# Patient Record
Sex: FEMALE | Race: WHITE | NOT HISPANIC OR LATINO | Employment: OTHER
[De-identification: names, ages, dates, MRNs, and addresses within clinical notes are randomized per-mention and may not be internally consistent; named-entity substitution may affect disease eponyms.]

---

## 2017-02-28 ENCOUNTER — RECORDS - HEALTHEAST (OUTPATIENT)
Dept: ADMINISTRATIVE | Facility: OTHER | Age: 82
End: 2017-02-28

## 2017-04-20 ENCOUNTER — RECORDS - HEALTHEAST (OUTPATIENT)
Dept: ADMINISTRATIVE | Facility: OTHER | Age: 82
End: 2017-04-20

## 2017-05-10 ENCOUNTER — COMMUNICATION - HEALTHEAST (OUTPATIENT)
Dept: FAMILY MEDICINE | Facility: CLINIC | Age: 82
End: 2017-05-10

## 2017-05-10 DIAGNOSIS — K21.9 ESOPHAGEAL REFLUX: ICD-10-CM

## 2017-05-10 DIAGNOSIS — R32 URINARY INCONTINENCE: ICD-10-CM

## 2017-08-11 ENCOUNTER — COMMUNICATION - HEALTHEAST (OUTPATIENT)
Dept: FAMILY MEDICINE | Facility: CLINIC | Age: 82
End: 2017-08-11

## 2017-08-11 DIAGNOSIS — E78.5 HYPERLIPIDEMIA: ICD-10-CM

## 2017-09-07 ENCOUNTER — OFFICE VISIT - HEALTHEAST (OUTPATIENT)
Dept: FAMILY MEDICINE | Facility: CLINIC | Age: 82
End: 2017-09-07

## 2017-09-07 DIAGNOSIS — K21.9 GASTROESOPHAGEAL REFLUX DISEASE WITHOUT ESOPHAGITIS: ICD-10-CM

## 2017-09-07 DIAGNOSIS — M81.0 OSTEOPOROSIS: ICD-10-CM

## 2017-09-07 DIAGNOSIS — K62.5 BRIGHT RED RECTAL BLEEDING: ICD-10-CM

## 2017-09-07 DIAGNOSIS — G47.00 INSOMNIA, UNSPECIFIED: ICD-10-CM

## 2017-09-07 DIAGNOSIS — E78.5 HYPERLIPIDEMIA: ICD-10-CM

## 2017-09-07 DIAGNOSIS — N39.41 URGE INCONTINENCE: ICD-10-CM

## 2017-09-07 DIAGNOSIS — L94.0 CIRCUMSCRIBED SCLERODERMA: ICD-10-CM

## 2017-09-07 DIAGNOSIS — Z00.00 MEDICARE ANNUAL WELLNESS VISIT, INITIAL: ICD-10-CM

## 2017-09-07 DIAGNOSIS — Z23 NEED FOR VACCINATION: ICD-10-CM

## 2017-09-07 LAB
CHOLEST SERPL-MCNC: 168 MG/DL
FASTING STATUS PATIENT QL REPORTED: YES
HDLC SERPL-MCNC: 44 MG/DL
LDLC SERPL CALC-MCNC: 104 MG/DL
TRIGL SERPL-MCNC: 100 MG/DL

## 2017-09-07 ASSESSMENT — MIFFLIN-ST. JEOR: SCORE: 1067.52

## 2017-09-11 ENCOUNTER — COMMUNICATION - HEALTHEAST (OUTPATIENT)
Dept: FAMILY MEDICINE | Facility: CLINIC | Age: 82
End: 2017-09-11

## 2017-09-12 ENCOUNTER — COMMUNICATION - HEALTHEAST (OUTPATIENT)
Dept: FAMILY MEDICINE | Facility: CLINIC | Age: 82
End: 2017-09-12

## 2017-09-21 ENCOUNTER — RECORDS - HEALTHEAST (OUTPATIENT)
Dept: ADMINISTRATIVE | Facility: OTHER | Age: 82
End: 2017-09-21

## 2017-09-21 ENCOUNTER — RECORDS - HEALTHEAST (OUTPATIENT)
Dept: BONE DENSITY | Facility: CLINIC | Age: 82
End: 2017-09-21

## 2017-09-21 DIAGNOSIS — M81.0 AGE-RELATED OSTEOPOROSIS WITHOUT CURRENT PATHOLOGICAL FRACTURE: ICD-10-CM

## 2017-09-25 ENCOUNTER — COMMUNICATION - HEALTHEAST (OUTPATIENT)
Dept: FAMILY MEDICINE | Facility: CLINIC | Age: 82
End: 2017-09-25

## 2017-10-19 ENCOUNTER — HOSPITAL ENCOUNTER (OUTPATIENT)
Dept: MAMMOGRAPHY | Facility: HOSPITAL | Age: 82
Discharge: HOME | End: 2017-10-19
Attending: FAMILY MEDICINE

## 2017-10-19 DIAGNOSIS — Z12.31 VISIT FOR SCREENING MAMMOGRAM: ICD-10-CM

## 2017-11-09 ENCOUNTER — COMMUNICATION - HEALTHEAST (OUTPATIENT)
Dept: FAMILY MEDICINE | Facility: CLINIC | Age: 82
End: 2017-11-09

## 2017-11-09 DIAGNOSIS — E78.5 HYPERLIPIDEMIA: ICD-10-CM

## 2018-02-09 ENCOUNTER — COMMUNICATION - HEALTHEAST (OUTPATIENT)
Dept: FAMILY MEDICINE | Facility: CLINIC | Age: 83
End: 2018-02-09

## 2018-02-09 DIAGNOSIS — E78.5 HYPERLIPIDEMIA: ICD-10-CM

## 2018-05-09 ENCOUNTER — COMMUNICATION - HEALTHEAST (OUTPATIENT)
Dept: FAMILY MEDICINE | Facility: CLINIC | Age: 83
End: 2018-05-09

## 2018-05-09 DIAGNOSIS — E78.5 HYPERLIPIDEMIA: ICD-10-CM

## 2018-05-09 DIAGNOSIS — R32 URINARY INCONTINENCE: ICD-10-CM

## 2018-05-10 ENCOUNTER — RECORDS - HEALTHEAST (OUTPATIENT)
Dept: ADMINISTRATIVE | Facility: OTHER | Age: 83
End: 2018-05-10

## 2018-06-15 ENCOUNTER — RECORDS - HEALTHEAST (OUTPATIENT)
Dept: ADMINISTRATIVE | Facility: OTHER | Age: 83
End: 2018-06-15

## 2018-08-11 ENCOUNTER — COMMUNICATION - HEALTHEAST (OUTPATIENT)
Dept: FAMILY MEDICINE | Facility: CLINIC | Age: 83
End: 2018-08-11

## 2018-08-11 DIAGNOSIS — E78.5 HYPERLIPIDEMIA: ICD-10-CM

## 2018-08-11 DIAGNOSIS — K21.9 ESOPHAGEAL REFLUX: ICD-10-CM

## 2018-10-31 ENCOUNTER — OFFICE VISIT - HEALTHEAST (OUTPATIENT)
Dept: FAMILY MEDICINE | Facility: CLINIC | Age: 83
End: 2018-10-31

## 2018-10-31 DIAGNOSIS — E78.5 HYPERLIPIDEMIA: ICD-10-CM

## 2018-10-31 DIAGNOSIS — M81.0 OSTEOPOROSIS: ICD-10-CM

## 2018-10-31 DIAGNOSIS — Z00.00 MEDICARE ANNUAL WELLNESS VISIT, SUBSEQUENT: ICD-10-CM

## 2018-10-31 DIAGNOSIS — N39.41 URGE INCONTINENCE: ICD-10-CM

## 2018-10-31 DIAGNOSIS — G47.00 INSOMNIA, UNSPECIFIED: ICD-10-CM

## 2018-10-31 DIAGNOSIS — M17.0 BILATERAL PRIMARY OSTEOARTHRITIS OF KNEE: ICD-10-CM

## 2018-10-31 DIAGNOSIS — R26.89 BALANCE PROBLEM: ICD-10-CM

## 2018-10-31 DIAGNOSIS — K21.9 GASTROESOPHAGEAL REFLUX DISEASE WITHOUT ESOPHAGITIS: ICD-10-CM

## 2018-10-31 DIAGNOSIS — L94.0 CIRCUMSCRIBED SCLERODERMA: ICD-10-CM

## 2018-10-31 LAB
ALBUMIN SERPL-MCNC: 3.8 G/DL (ref 3.5–5)
ALP SERPL-CCNC: 78 U/L (ref 45–120)
ALT SERPL W P-5'-P-CCNC: 17 U/L (ref 0–45)
ANION GAP SERPL CALCULATED.3IONS-SCNC: 10 MMOL/L (ref 5–18)
AST SERPL W P-5'-P-CCNC: 24 U/L (ref 0–40)
BILIRUB SERPL-MCNC: 0.6 MG/DL (ref 0–1)
BUN SERPL-MCNC: 12 MG/DL (ref 8–28)
CALCIUM SERPL-MCNC: 9.8 MG/DL (ref 8.5–10.5)
CHLORIDE BLD-SCNC: 107 MMOL/L (ref 98–107)
CHOLEST SERPL-MCNC: 152 MG/DL
CO2 SERPL-SCNC: 27 MMOL/L (ref 22–31)
CREAT SERPL-MCNC: 0.81 MG/DL (ref 0.6–1.1)
FASTING STATUS PATIENT QL REPORTED: YES
GFR SERPL CREATININE-BSD FRML MDRD: >60 ML/MIN/1.73M2
GLUCOSE BLD-MCNC: 87 MG/DL (ref 70–125)
HDLC SERPL-MCNC: 45 MG/DL
LDLC SERPL CALC-MCNC: 84 MG/DL
POTASSIUM BLD-SCNC: 4.2 MMOL/L (ref 3.5–5)
PROT SERPL-MCNC: 6.6 G/DL (ref 6–8)
SODIUM SERPL-SCNC: 144 MMOL/L (ref 136–145)
TRIGL SERPL-MCNC: 117 MG/DL

## 2018-10-31 ASSESSMENT — MIFFLIN-ST. JEOR: SCORE: 1068.09

## 2018-11-01 LAB — 25(OH)D3 SERPL-MCNC: 44 NG/ML (ref 30–80)

## 2018-11-03 ENCOUNTER — COMMUNICATION - HEALTHEAST (OUTPATIENT)
Dept: FAMILY MEDICINE | Facility: CLINIC | Age: 83
End: 2018-11-03

## 2018-11-15 ENCOUNTER — COMMUNICATION - HEALTHEAST (OUTPATIENT)
Dept: FAMILY MEDICINE | Facility: CLINIC | Age: 83
End: 2018-11-15

## 2018-11-15 DIAGNOSIS — R32 URINARY INCONTINENCE: ICD-10-CM

## 2018-11-15 DIAGNOSIS — E78.5 HYPERLIPIDEMIA: ICD-10-CM

## 2019-02-14 ENCOUNTER — COMMUNICATION - HEALTHEAST (OUTPATIENT)
Dept: FAMILY MEDICINE | Facility: CLINIC | Age: 84
End: 2019-02-14

## 2019-02-14 DIAGNOSIS — K21.9 ESOPHAGEAL REFLUX: ICD-10-CM

## 2019-06-27 ENCOUNTER — RECORDS - HEALTHEAST (OUTPATIENT)
Dept: ADMINISTRATIVE | Facility: OTHER | Age: 84
End: 2019-06-27

## 2019-11-06 ENCOUNTER — OFFICE VISIT - HEALTHEAST (OUTPATIENT)
Dept: FAMILY MEDICINE | Facility: CLINIC | Age: 84
End: 2019-11-06

## 2019-11-06 DIAGNOSIS — K21.9 GASTROESOPHAGEAL REFLUX DISEASE WITHOUT ESOPHAGITIS: ICD-10-CM

## 2019-11-06 DIAGNOSIS — G47.00 INSOMNIA, UNSPECIFIED TYPE: ICD-10-CM

## 2019-11-06 DIAGNOSIS — E78.5 HYPERLIPIDEMIA, UNSPECIFIED HYPERLIPIDEMIA TYPE: ICD-10-CM

## 2019-11-06 DIAGNOSIS — N39.41 URGE INCONTINENCE: ICD-10-CM

## 2019-11-06 DIAGNOSIS — R26.89 BALANCE PROBLEM: ICD-10-CM

## 2019-11-06 DIAGNOSIS — M17.0 BILATERAL PRIMARY OSTEOARTHRITIS OF KNEE: ICD-10-CM

## 2019-11-06 DIAGNOSIS — Z00.00 MEDICARE ANNUAL WELLNESS VISIT, SUBSEQUENT: ICD-10-CM

## 2019-11-06 DIAGNOSIS — M81.8 OTHER OSTEOPOROSIS WITHOUT CURRENT PATHOLOGICAL FRACTURE: ICD-10-CM

## 2019-11-06 DIAGNOSIS — L94.0 CIRCUMSCRIBED SCLERODERMA: ICD-10-CM

## 2019-11-06 LAB
ALBUMIN SERPL-MCNC: 4.1 G/DL (ref 3.5–5)
ANION GAP SERPL CALCULATED.3IONS-SCNC: 10 MMOL/L (ref 5–18)
BUN SERPL-MCNC: 16 MG/DL (ref 8–28)
CALCIUM SERPL-MCNC: 9.8 MG/DL (ref 8.5–10.5)
CHLORIDE BLD-SCNC: 109 MMOL/L (ref 98–107)
CHOLEST SERPL-MCNC: 160 MG/DL
CO2 SERPL-SCNC: 27 MMOL/L (ref 22–31)
CREAT SERPL-MCNC: 0.77 MG/DL (ref 0.6–1.1)
FASTING STATUS PATIENT QL REPORTED: YES
GFR SERPL CREATININE-BSD FRML MDRD: >60 ML/MIN/1.73M2
GLUCOSE BLD-MCNC: 79 MG/DL (ref 70–125)
HDLC SERPL-MCNC: 51 MG/DL
LDLC SERPL CALC-MCNC: 91 MG/DL
PHOSPHATE SERPL-MCNC: 3.7 MG/DL (ref 2.5–4.5)
POTASSIUM BLD-SCNC: 4.4 MMOL/L (ref 3.5–5)
PTH-INTACT SERPL-MCNC: 52 PG/ML (ref 10–86)
SODIUM SERPL-SCNC: 146 MMOL/L (ref 136–145)
TRIGL SERPL-MCNC: 88 MG/DL
TSH SERPL DL<=0.005 MIU/L-ACNC: 2.11 UIU/ML (ref 0.3–5)

## 2019-11-06 RX ORDER — CLOBETASOL PROPIONATE 0.5 MG/G
OINTMENT TOPICAL
Qty: 30 G | Refills: 3 | Status: SHIPPED | OUTPATIENT
Start: 2019-11-06

## 2019-11-06 ASSESSMENT — MIFFLIN-ST. JEOR: SCORE: 1079.77

## 2019-11-07 LAB — 25(OH)D3 SERPL-MCNC: 44.9 NG/ML (ref 30–80)

## 2019-11-13 ENCOUNTER — RECORDS - HEALTHEAST (OUTPATIENT)
Dept: BONE DENSITY | Facility: CLINIC | Age: 84
End: 2019-11-13

## 2019-11-13 ENCOUNTER — RECORDS - HEALTHEAST (OUTPATIENT)
Dept: ADMINISTRATIVE | Facility: OTHER | Age: 84
End: 2019-11-13

## 2019-11-13 DIAGNOSIS — M81.0 AGE-RELATED OSTEOPOROSIS WITHOUT CURRENT PATHOLOGICAL FRACTURE: ICD-10-CM

## 2019-11-15 ENCOUNTER — COMMUNICATION - HEALTHEAST (OUTPATIENT)
Dept: FAMILY MEDICINE | Facility: CLINIC | Age: 84
End: 2019-11-15

## 2019-11-16 ENCOUNTER — COMMUNICATION - HEALTHEAST (OUTPATIENT)
Dept: FAMILY MEDICINE | Facility: CLINIC | Age: 84
End: 2019-11-16

## 2019-11-20 ENCOUNTER — HOSPITAL ENCOUNTER (OUTPATIENT)
Dept: MAMMOGRAPHY | Facility: CLINIC | Age: 84
Discharge: HOME | End: 2019-11-20
Attending: FAMILY MEDICINE

## 2019-11-20 DIAGNOSIS — Z12.31 VISIT FOR SCREENING MAMMOGRAM: ICD-10-CM

## 2020-10-22 ENCOUNTER — RECORDS - HEALTHEAST (OUTPATIENT)
Dept: ADMINISTRATIVE | Facility: OTHER | Age: 85
End: 2020-10-22

## 2020-10-29 ENCOUNTER — COMMUNICATION - HEALTHEAST (OUTPATIENT)
Dept: FAMILY MEDICINE | Facility: CLINIC | Age: 85
End: 2020-10-29

## 2020-10-29 DIAGNOSIS — K21.9 GASTROESOPHAGEAL REFLUX DISEASE WITHOUT ESOPHAGITIS: ICD-10-CM

## 2020-12-02 ENCOUNTER — COMMUNICATION - HEALTHEAST (OUTPATIENT)
Dept: FAMILY MEDICINE | Facility: CLINIC | Age: 85
End: 2020-12-02

## 2020-12-02 DIAGNOSIS — E78.5 HYPERLIPIDEMIA, UNSPECIFIED HYPERLIPIDEMIA TYPE: ICD-10-CM

## 2020-12-21 ENCOUNTER — OFFICE VISIT - HEALTHEAST (OUTPATIENT)
Dept: FAMILY MEDICINE | Facility: CLINIC | Age: 85
End: 2020-12-21

## 2020-12-21 DIAGNOSIS — R26.89 BALANCE PROBLEM: ICD-10-CM

## 2020-12-21 DIAGNOSIS — N39.41 URGE INCONTINENCE: ICD-10-CM

## 2020-12-21 DIAGNOSIS — R47.89 WORD FINDING DIFFICULTY: ICD-10-CM

## 2020-12-21 DIAGNOSIS — M81.0 AGE-RELATED OSTEOPOROSIS WITHOUT CURRENT PATHOLOGICAL FRACTURE: ICD-10-CM

## 2020-12-21 DIAGNOSIS — K21.9 GASTROESOPHAGEAL REFLUX DISEASE WITHOUT ESOPHAGITIS: ICD-10-CM

## 2020-12-21 DIAGNOSIS — Z00.00 MEDICARE ANNUAL WELLNESS VISIT, SUBSEQUENT: ICD-10-CM

## 2020-12-21 DIAGNOSIS — K59.01 SLOW TRANSIT CONSTIPATION: ICD-10-CM

## 2020-12-21 DIAGNOSIS — E78.5 HYPERLIPIDEMIA, UNSPECIFIED HYPERLIPIDEMIA TYPE: ICD-10-CM

## 2020-12-21 DIAGNOSIS — L94.0 CIRCUMSCRIBED SCLERODERMA: ICD-10-CM

## 2020-12-21 DIAGNOSIS — G47.00 INSOMNIA, UNSPECIFIED TYPE: ICD-10-CM

## 2020-12-21 DIAGNOSIS — M17.0 BILATERAL PRIMARY OSTEOARTHRITIS OF KNEE: ICD-10-CM

## 2020-12-21 LAB
ALBUMIN SERPL-MCNC: 4 G/DL (ref 3.5–5)
ALP SERPL-CCNC: 95 U/L (ref 45–120)
ALT SERPL W P-5'-P-CCNC: 16 U/L (ref 0–45)
ANION GAP SERPL CALCULATED.3IONS-SCNC: 10 MMOL/L (ref 5–18)
AST SERPL W P-5'-P-CCNC: 24 U/L (ref 0–40)
BILIRUB SERPL-MCNC: 0.4 MG/DL (ref 0–1)
BUN SERPL-MCNC: 13 MG/DL (ref 8–28)
CALCIUM SERPL-MCNC: 9.4 MG/DL (ref 8.5–10.5)
CHLORIDE BLD-SCNC: 108 MMOL/L (ref 98–107)
CHOLEST SERPL-MCNC: 147 MG/DL
CO2 SERPL-SCNC: 27 MMOL/L (ref 22–31)
CREAT SERPL-MCNC: 0.79 MG/DL (ref 0.6–1.1)
FASTING STATUS PATIENT QL REPORTED: YES
GFR SERPL CREATININE-BSD FRML MDRD: >60 ML/MIN/1.73M2
GLUCOSE BLD-MCNC: 98 MG/DL (ref 70–125)
HDLC SERPL-MCNC: 45 MG/DL
LDLC SERPL CALC-MCNC: 83 MG/DL
POTASSIUM BLD-SCNC: 4.2 MMOL/L (ref 3.5–5)
PROT SERPL-MCNC: 6.8 G/DL (ref 6–8)
SODIUM SERPL-SCNC: 145 MMOL/L (ref 136–145)
TRIGL SERPL-MCNC: 94 MG/DL
VIT B12 SERPL-MCNC: 390 PG/ML (ref 213–816)

## 2020-12-21 ASSESSMENT — MIFFLIN-ST. JEOR: SCORE: 1095.42

## 2020-12-22 LAB — 25(OH)D3 SERPL-MCNC: 43.6 NG/ML (ref 30–80)

## 2020-12-30 ENCOUNTER — COMMUNICATION - HEALTHEAST (OUTPATIENT)
Dept: FAMILY MEDICINE | Facility: CLINIC | Age: 85
End: 2020-12-30

## 2020-12-30 ENCOUNTER — HOSPITAL ENCOUNTER (OUTPATIENT)
Dept: MAMMOGRAPHY | Facility: CLINIC | Age: 85
Discharge: HOME | End: 2020-12-30
Attending: FAMILY MEDICINE

## 2020-12-30 DIAGNOSIS — Z12.31 VISIT FOR SCREENING MAMMOGRAM: ICD-10-CM

## 2021-01-01 ENCOUNTER — RECORDS - HEALTHEAST (OUTPATIENT)
Dept: ADMINISTRATIVE | Facility: CLINIC | Age: 86
End: 2021-01-01

## 2021-01-01 ENCOUNTER — HEALTH MAINTENANCE LETTER (OUTPATIENT)
Age: 86
End: 2021-01-01

## 2021-01-01 ENCOUNTER — COMMUNICATION - HEALTHEAST (OUTPATIENT)
Dept: FAMILY MEDICINE | Facility: CLINIC | Age: 86
End: 2021-01-01

## 2021-01-01 ENCOUNTER — RECORDS - HEALTHEAST (OUTPATIENT)
Dept: FAMILY MEDICINE | Facility: CLINIC | Age: 86
End: 2021-01-01

## 2021-01-01 ENCOUNTER — HOSPITAL ENCOUNTER (OUTPATIENT)
Dept: CARDIOLOGY | Facility: CLINIC | Age: 86
Discharge: HOME | End: 2021-11-22
Attending: FAMILY MEDICINE | Admitting: FAMILY MEDICINE
Payer: COMMERCIAL

## 2021-01-01 ENCOUNTER — TELEPHONE (OUTPATIENT)
Dept: FAMILY MEDICINE | Facility: CLINIC | Age: 86
End: 2021-01-01
Payer: COMMERCIAL

## 2021-01-01 ENCOUNTER — AMBULATORY - HEALTHEAST (OUTPATIENT)
Dept: NURSING | Facility: CLINIC | Age: 86
End: 2021-01-01

## 2021-01-01 ENCOUNTER — TRANSFERRED RECORDS (OUTPATIENT)
Dept: HEALTH INFORMATION MANAGEMENT | Facility: CLINIC | Age: 86
End: 2021-01-01
Payer: COMMERCIAL

## 2021-01-01 ENCOUNTER — OFFICE VISIT (OUTPATIENT)
Dept: FAMILY MEDICINE | Facility: CLINIC | Age: 86
End: 2021-01-01
Payer: COMMERCIAL

## 2021-01-01 VITALS — WEIGHT: 146.9 LBS | HEIGHT: 63 IN | BODY MASS INDEX: 26.03 KG/M2

## 2021-01-01 VITALS
DIASTOLIC BLOOD PRESSURE: 60 MMHG | HEIGHT: 63 IN | HEART RATE: 72 BPM | OXYGEN SATURATION: 98 % | RESPIRATION RATE: 20 BRPM | BODY MASS INDEX: 26.51 KG/M2 | SYSTOLIC BLOOD PRESSURE: 112 MMHG | WEIGHT: 149.6 LBS | TEMPERATURE: 98.1 F

## 2021-01-01 VITALS
WEIGHT: 149.2 LBS | HEART RATE: 88 BPM | DIASTOLIC BLOOD PRESSURE: 86 MMHG | BODY MASS INDEX: 26.43 KG/M2 | SYSTOLIC BLOOD PRESSURE: 130 MMHG

## 2021-01-01 VITALS — HEIGHT: 62 IN | WEIGHT: 147.9 LBS | BODY MASS INDEX: 27.22 KG/M2

## 2021-01-01 VITALS
SYSTOLIC BLOOD PRESSURE: 128 MMHG | HEART RATE: 75 BPM | HEIGHT: 63 IN | TEMPERATURE: 98.7 F | RESPIRATION RATE: 20 BRPM | OXYGEN SATURATION: 97 % | DIASTOLIC BLOOD PRESSURE: 78 MMHG | WEIGHT: 151.3 LBS | BODY MASS INDEX: 26.81 KG/M2

## 2021-01-01 VITALS
TEMPERATURE: 97.6 F | DIASTOLIC BLOOD PRESSURE: 84 MMHG | RESPIRATION RATE: 16 BRPM | HEART RATE: 75 BPM | WEIGHT: 147.6 LBS | SYSTOLIC BLOOD PRESSURE: 128 MMHG | BODY MASS INDEX: 26.15 KG/M2 | OXYGEN SATURATION: 98 %

## 2021-01-01 DIAGNOSIS — K21.9 GASTROESOPHAGEAL REFLUX DISEASE WITHOUT ESOPHAGITIS: ICD-10-CM

## 2021-01-01 DIAGNOSIS — Z12.31 OTHER SCREENING MAMMOGRAM: ICD-10-CM

## 2021-01-01 DIAGNOSIS — B35.3 TINEA PEDIS OF RIGHT FOOT: ICD-10-CM

## 2021-01-01 DIAGNOSIS — L94.0 CIRCUMSCRIBED SCLERODERMA: ICD-10-CM

## 2021-01-01 DIAGNOSIS — R01.1 SYSTOLIC EJECTION MURMUR: ICD-10-CM

## 2021-01-01 DIAGNOSIS — N39.41 URGE INCONTINENCE: ICD-10-CM

## 2021-01-01 DIAGNOSIS — E78.5 HYPERLIPIDEMIA, UNSPECIFIED HYPERLIPIDEMIA TYPE: ICD-10-CM

## 2021-01-01 DIAGNOSIS — G89.29 CHRONIC PAIN OF BOTH KNEES: Primary | ICD-10-CM

## 2021-01-01 DIAGNOSIS — M81.0 AGE-RELATED OSTEOPOROSIS WITHOUT CURRENT PATHOLOGICAL FRACTURE: ICD-10-CM

## 2021-01-01 DIAGNOSIS — M25.561 CHRONIC PAIN OF BOTH KNEES: Primary | ICD-10-CM

## 2021-01-01 DIAGNOSIS — M25.562 CHRONIC PAIN OF BOTH KNEES: Primary | ICD-10-CM

## 2021-01-01 DIAGNOSIS — E78.00 PURE HYPERCHOLESTEROLEMIA: ICD-10-CM

## 2021-01-01 DIAGNOSIS — Z00.00 MEDICARE ANNUAL WELLNESS VISIT, SUBSEQUENT: Primary | ICD-10-CM

## 2021-01-01 LAB
ALBUMIN SERPL-MCNC: 3.8 G/DL (ref 3.5–5)
ALP SERPL-CCNC: 73 U/L (ref 45–120)
ALT SERPL W P-5'-P-CCNC: 16 U/L (ref 0–45)
ANION GAP SERPL CALCULATED.3IONS-SCNC: 10 MMOL/L (ref 5–18)
AST SERPL W P-5'-P-CCNC: 24 U/L (ref 0–40)
BILIRUB SERPL-MCNC: 0.5 MG/DL (ref 0–1)
BUN SERPL-MCNC: 14 MG/DL (ref 8–28)
CALCIUM SERPL-MCNC: 9.6 MG/DL (ref 8.5–10.5)
CHLORIDE BLD-SCNC: 108 MMOL/L (ref 98–107)
CHOLEST SERPL-MCNC: 140 MG/DL
CO2 SERPL-SCNC: 27 MMOL/L (ref 22–31)
CREAT SERPL-MCNC: 0.75 MG/DL (ref 0.6–1.1)
DEPRECATED CALCIDIOL+CALCIFEROL SERPL-MC: 47 UG/L (ref 30–80)
FASTING STATUS PATIENT QL REPORTED: YES
GFR SERPL CREATININE-BSD FRML MDRD: 72 ML/MIN/1.73M2
GLUCOSE BLD-MCNC: 83 MG/DL (ref 70–125)
HDLC SERPL-MCNC: 46 MG/DL
LDLC SERPL CALC-MCNC: 75 MG/DL
LVEF ECHO: NORMAL
POTASSIUM BLD-SCNC: 4 MMOL/L (ref 3.5–5)
PROT SERPL-MCNC: 6.5 G/DL (ref 6–8)
SODIUM SERPL-SCNC: 145 MMOL/L (ref 136–145)
TRIGL SERPL-MCNC: 94 MG/DL

## 2021-01-01 PROCEDURE — 80053 COMPREHEN METABOLIC PANEL: CPT | Performed by: FAMILY MEDICINE

## 2021-01-01 PROCEDURE — 93306 TTE W/DOPPLER COMPLETE: CPT

## 2021-01-01 PROCEDURE — 99213 OFFICE O/P EST LOW 20 MIN: CPT | Performed by: FAMILY MEDICINE

## 2021-01-01 PROCEDURE — 93306 TTE W/DOPPLER COMPLETE: CPT | Mod: 26 | Performed by: INTERNAL MEDICINE

## 2021-01-01 PROCEDURE — 99397 PER PM REEVAL EST PAT 65+ YR: CPT | Performed by: FAMILY MEDICINE

## 2021-01-01 PROCEDURE — 99214 OFFICE O/P EST MOD 30 MIN: CPT | Mod: 25 | Performed by: FAMILY MEDICINE

## 2021-01-01 PROCEDURE — 82306 VITAMIN D 25 HYDROXY: CPT | Performed by: FAMILY MEDICINE

## 2021-01-01 PROCEDURE — 80061 LIPID PANEL: CPT | Performed by: FAMILY MEDICINE

## 2021-01-01 PROCEDURE — 36415 COLL VENOUS BLD VENIPUNCTURE: CPT | Performed by: FAMILY MEDICINE

## 2021-01-01 RX ORDER — LOVASTATIN 40 MG
TABLET ORAL
Qty: 180 TABLET | Refills: 3 | Status: SHIPPED | OUTPATIENT
Start: 2021-01-01

## 2021-01-01 RX ORDER — CLOTRIMAZOLE 1 %
CREAM (GRAM) TOPICAL 2 TIMES DAILY
Qty: 30 G | Refills: 0 | Status: SHIPPED | OUTPATIENT
Start: 2021-01-01 | End: 2021-01-01

## 2021-01-01 RX ORDER — LOVASTATIN 40 MG
TABLET ORAL
Qty: 180 TABLET | Refills: 2 | Status: SHIPPED | OUTPATIENT
Start: 2021-01-01 | End: 2021-01-01

## 2021-01-01 RX ORDER — TOLTERODINE 4 MG/1
4 CAPSULE, EXTENDED RELEASE ORAL DAILY
Qty: 90 CAPSULE | Refills: 3 | Status: SHIPPED | OUTPATIENT
Start: 2021-01-01

## 2021-01-01 SDOH — ECONOMIC STABILITY: TRANSPORTATION INSECURITY
IN THE PAST 12 MONTHS, HAS LACK OF TRANSPORTATION KEPT YOU FROM MEETINGS, WORK, OR FROM GETTING THINGS NEEDED FOR DAILY LIVING?: NO

## 2021-01-01 SDOH — ECONOMIC STABILITY: TRANSPORTATION INSECURITY
IN THE PAST 12 MONTHS, HAS THE LACK OF TRANSPORTATION KEPT YOU FROM MEDICAL APPOINTMENTS OR FROM GETTING MEDICATIONS?: NO

## 2021-01-01 SDOH — HEALTH STABILITY: PHYSICAL HEALTH: ON AVERAGE, HOW MANY MINUTES DO YOU ENGAGE IN EXERCISE AT THIS LEVEL?: 30 MIN

## 2021-01-01 SDOH — ECONOMIC STABILITY: FOOD INSECURITY: WITHIN THE PAST 12 MONTHS, YOU WORRIED THAT YOUR FOOD WOULD RUN OUT BEFORE YOU GOT MONEY TO BUY MORE.: NEVER TRUE

## 2021-01-01 SDOH — ECONOMIC STABILITY: INCOME INSECURITY: HOW HARD IS IT FOR YOU TO PAY FOR THE VERY BASICS LIKE FOOD, HOUSING, MEDICAL CARE, AND HEATING?: NOT HARD AT ALL

## 2021-01-01 SDOH — ECONOMIC STABILITY: FOOD INSECURITY: WITHIN THE PAST 12 MONTHS, THE FOOD YOU BOUGHT JUST DIDN'T LAST AND YOU DIDN'T HAVE MONEY TO GET MORE.: NEVER TRUE

## 2021-01-01 SDOH — HEALTH STABILITY: PHYSICAL HEALTH: ON AVERAGE, HOW MANY DAYS PER WEEK DO YOU ENGAGE IN MODERATE TO STRENUOUS EXERCISE (LIKE A BRISK WALK)?: 2 DAYS

## 2021-01-01 SDOH — ECONOMIC STABILITY: INCOME INSECURITY: IN THE LAST 12 MONTHS, WAS THERE A TIME WHEN YOU WERE NOT ABLE TO PAY THE MORTGAGE OR RENT ON TIME?: NO

## 2021-01-01 ASSESSMENT — SOCIAL DETERMINANTS OF HEALTH (SDOH)
DO YOU BELONG TO ANY CLUBS OR ORGANIZATIONS SUCH AS CHURCH GROUPS UNIONS, FRATERNAL OR ATHLETIC GROUPS, OR SCHOOL GROUPS?: NO
IN A TYPICAL WEEK, HOW MANY TIMES DO YOU TALK ON THE PHONE WITH FAMILY, FRIENDS, OR NEIGHBORS?: MORE THAN THREE TIMES A WEEK
HOW OFTEN DO YOU GET TOGETHER WITH FRIENDS OR RELATIVES?: THREE TIMES A WEEK
HOW OFTEN DO YOU ATTEND CHURCH OR RELIGIOUS SERVICES?: MORE THAN 4 TIMES PER YEAR

## 2021-01-01 ASSESSMENT — ACTIVITIES OF DAILY LIVING (ADL): CURRENT_FUNCTION: NO ASSISTANCE NEEDED

## 2021-01-01 ASSESSMENT — LIFESTYLE VARIABLES
HOW OFTEN DO YOU HAVE SIX OR MORE DRINKS ON ONE OCCASION: NEVER
HOW MANY STANDARD DRINKS CONTAINING ALCOHOL DO YOU HAVE ON A TYPICAL DAY: PATIENT DECLINED
HOW OFTEN DO YOU HAVE A DRINK CONTAINING ALCOHOL: NEVER

## 2021-06-03 NOTE — PROGRESS NOTES
Assessment and Plan:     1. Medicare annual wellness visit, subsequent  At today's visit, we discussed lifestyle interventions to promote self-management and wellness, including maintenance of a healthy weight, healthy diet, regular physical activity and exercise, and falls prevention.  Immunizations reviewed and up-to-date.  Will obtain fasting lipids and fasting glucose today.  Cancer screening no longer indicated.  Encouraged efforts at regular exercise.  Will address balance as noted below.  Advanced healthcare directive on file.    2. Hyperlipidemia, unspecified hyperlipidemia type  Encourage continued efforts at healthy lifestyle habits.  Continue lovastatin.  We will check fasting lipid cascade today.  - Lipid Cascade FASTING  - lovastatin (MEVACOR) 40 MG tablet; Take 2 tablets (80 mg total) by mouth at bedtime.  Dispense: 180 tablet; Refill: 3    3. Gastroesophageal reflux disease without esophagitis  Adequately controlled on omeprazole daily, will continue as she has failed prior to moving  - omeprazole (PRILOSEC) 20 MG capsule; Take 1 capsule (20 mg total) by mouth daily.  Dispense: 90 capsule; Refill: 2    4. Lichen Sclerosus Et Atrophicus  Controlled with pain control, follow-up with gynecology.  - clobetasol (TEMOVATE) 0.05 % ointment; Apply to affected area twice daily as needed.  Dispense: 30 g; Refill: 3    5. Insomnia, unspecified type  Encouraged continued efforts at good sleep hygiene.  Energy level is okay.  Continue to avoid caffeine late in the day and continue regular exercise    6. Urge Incontinence Of Urine  Chronic, working with Dr. Clemons in regards to considering Botox versus InterStim.  We will restart tolterodine and Myrbetriq was not cost effective for her.  Encouraged consideration of pelvic floor physical therapy to address this further.  - tolterodine (DETROL LA) 4 MG ER capsule; Take 1 capsule (4 mg total) by mouth daily.  Dispense: 90 capsule; Refill: 4    7. Bilateral primary  osteoarthritis of knee  She will continue over-the-counter medications and supplements, continue physical therapy.    8. Balance problem  Encouraged regular physical activity.  Encouraged her to complete the physical therapy exercises as she had previously been assigned as this has been helpful.    9. Other osteoporosis without current pathological fracture   She is not interested in further treatment at this time, would like to see him to follow-up on density scans as high duration of treatment.  In the meantime was recommended increased intake of calcium and vitamin D.  Exercising by walking.  Order placed for follow-up  - Renal Function Profile  - Parathyroid Hormone Intact  - Vitamin D, Total (25-Hydroxy)  - Thyroid Cascade  - DXA Bone Density Scan; Future     The patient's current medical problems were reviewed.    The following health maintenance schedule was reviewed with the patient and provided in printed form in the after visit summary:   Health Maintenance   Topic Date Due     ZOSTER VACCINES (2 of 3) 09/06/2007     INFLUENZA VACCINE RULE BASED (1) 08/01/2019     DXA SCAN  09/21/2019     FALL RISK ASSESSMENT  10/31/2019     MEDICARE ANNUAL WELLNESS VISIT  10/31/2019     ADVANCE CARE PLANNING  10/31/2023     TD 18+ HE  07/07/2025     PNEUMOCOCCAL IMMUNIZATION 65+ LOW/MEDIUM RISK  Completed        Subjective:   Chief Complaint: An Haas is an 85 y.o. female here for an Annual Wellness visit.   HPI: History of esophageal reflux which is doing well on omeprazole.  She has attempted to wean the omeprazole but is failed on one medication.  History of osteoporosis diagnosed and is working to increase her intake of milk and calcium, she is not interested in treatment at this time would like to schedule a follow-up bone density scan shows.  She has a history of poor balance, completed physical therapy in 2018 for this and continues to do this exercises regularly.  She is also working on walking 2 times per  week.  No recent falls.  Remains on lovastatin management of dyslipidemia.  History of lichen sclerosis managed with clobetasol by gynecology.  Dr. Laurent is also helping her with her urge incontinence of urine, considering Botox versus InterStim.  In the past use Myrbetriq which was helpful but was very costly.  She is awakening about every 2 hours overnight, interested in an alternative medication, etc. although somewhat helpful in the past and she tolerated well.  Some struggles with insomnia, does not require medications for this that awakens about every 2 hours as noted.  She is been trying to go to bed earlier and feels her energy level overall is okay some struggles with bilateral knee osteoarthritis, really only, tramadol and supplements, previously physical therapy and management of this.  Using Preparation H as needed for external hemorrhoids without bleeding.    Review of Systems:   Please see above.  The rest of the review of systems are negative for all systems.    Patient Care Team:  Rosana Yepez MD as PCP - General  Amber Navarro MD (Dermatology)  Dave Guajardo MD as Physician (Otolaryngology)  Rosana Yepez MD as Assigned PCP     Patient Active Problem List   Diagnosis     Esophageal Reflux     Lichen Sclerosus Et Atrophicus     Insomnia     Urge Incontinence Of Urine     Arthritis     Hyperlipidemia     Osteoporosis     Bilateral primary osteoarthritis of knee     Balance problem     No past medical history on file.   Past Surgical History:   Procedure Laterality Date     BREAST BIOPSY       HYSTERECTOMY       OOPHORECTOMY      ?unknown with hysterectomy?     [place] APPENDECTOMY      Description: Appendectomy;  Recorded: 05/12/2008;     [place] LAP,SLING OPERATION      Description: Laparoscopic Sling Operation For Stress Incontinence;  Recorded: 05/12/2008;     [place] LAP,VAG HYST,UTERUS 250GMS/<      Description: Laparoscopy With Vaginal Hysterectomy;  Recorded: 06/02/2009;      Family  History   Problem Relation Age of Onset     Endometrial cancer Mother      Cancer Mother       Social History     Socioeconomic History     Marital status:      Spouse name: Not on file     Number of children: Not on file     Years of education: Not on file     Highest education level: Not on file   Occupational History     Not on file   Social Needs     Financial resource strain: Not on file     Food insecurity:     Worry: Not on file     Inability: Not on file     Transportation needs:     Medical: Not on file     Non-medical: Not on file   Tobacco Use     Smoking status: Never Smoker     Smokeless tobacco: Never Used   Substance and Sexual Activity     Alcohol use: Not on file     Drug use: Not on file     Sexual activity: Not on file   Lifestyle     Physical activity:     Days per week: Not on file     Minutes per session: Not on file     Stress: Not on file   Relationships     Social connections:     Talks on phone: Not on file     Gets together: Not on file     Attends Gnosticist service: Not on file     Active member of club or organization: Not on file     Attends meetings of clubs or organizations: Not on file     Relationship status: Not on file     Intimate partner violence:     Fear of current or ex partner: Not on file     Emotionally abused: Not on file     Physically abused: Not on file     Forced sexual activity: Not on file   Other Topics Concern     Not on file   Social History Narrative     Not on file      Current Outpatient Medications   Medication Sig Dispense Refill     aspirin 81 MG EC tablet Take 81 mg by mouth daily.       CALCIUM CARBONATE/VITAMIN D3 (CALCIUM 600 + D,3, ORAL) Take by mouth daily.       cholecalciferol, vitamin D3, (VITAMIN D3) 400 unit cap        clobetasol (TEMOVATE) 0.05 % ointment Apply to affected area twice daily as needed. 30 g 3     clotrimazole (LOTRIMIN) 1 % cream Apply topically 2 (two) times a day as needed. 60 g 0     fluocinonide (LIDEX) 0.05 % ointment  "Apply a thin layer to affected area twice daily as needed. 30 g 3     lovastatin (MEVACOR) 40 MG tablet Take 2 tablets (80 mg total) by mouth at bedtime. 180 tablet 3     omeprazole (PRILOSEC) 20 MG capsule Take 1 capsule (20 mg total) by mouth daily. 90 capsule 2     tolterodine (DETROL LA) 4 MG ER capsule Take 1 capsule (4 mg total) by mouth daily. 90 capsule 4     No current facility-administered medications for this visit.       Objective:   Vital Signs:   Visit Vitals  /60   Pulse 72   Temp 98.1  F (36.7  C)   Resp 20   Ht 5' 2.5\" (1.588 m)   Wt 149 lb 9.6 oz (67.9 kg)   SpO2 98%   BMI 26.93 kg/m         VisionScreening:  No exam data present     PHYSICAL EXAM  Physical Examination: General appearance - alert, well appearing, and in no distress, oriented to person, place, and time and normal appearing weight  Mental status - alert, oriented to person, place, and time, normal mood, behavior, speech, dress, motor activity, and thought processes  Eyes - pupils equal and reactive, extraocular eye movements intact  Ears - bilateral TM's and external ear canals normal  Nose - normal and patent, no erythema, discharge or polyps  Mouth - mucous membranes moist, pharynx normal without lesions  Neck - supple, no significant adenopathy  Lymphatics - no palpable lymphadenopathy, no hepatosplenomegaly  Chest - clear to auscultation, no wheezes, rales or rhonchi, symmetric air entry  Heart - normal rate, regular rhythm, normal S1, S2, no murmurs, rubs, clicks or gallops  Abdomen - soft, nontender, nondistended, no masses or organomegaly  Breasts - breasts appear normal, no suspicious masses, no skin or nipple changes or axillary nodes  Neurological - alert, oriented, normal speech, no focal findings or movement disorder noted  Musculoskeletal - no joint tenderness, deformity or swelling  Extremities - peripheral pulses normal, no pedal edema, no clubbing or cyanosis  Skin - normal coloration and turgor, no rashes, no " suspicious skin lesions noted      Assessment Results 11/6/2019   Activities of Daily Living No help needed   Instrumental Activities of Daily Living No help needed   Mini Cog Total Score 5   Some recent data might be hidden     A Mini-Cog score of 0-2 suggests the possibility of dementia, score of 3-5 suggests no dementia    Identified Health Risks:     She is at risk for lack of exercise and has been provided with information to increase physical activity for the benefit of her well-being.  Information on urinary incontinence and treatment options given to patient.  Patient's advanced directive was discussed and I am comfortable with the patient's wishes.

## 2021-06-12 NOTE — TELEPHONE ENCOUNTER
Refill Approved    Rx renewed per Medication Renewal Policy. Medication was last renewed on 11/6/19.    Shikha Lepe, [facility] Connection Triage/Med Refill 11/2/2020     Requested Prescriptions   Pending Prescriptions Disp Refills     omeprazole (PRILOSEC) 20 MG capsule [Pharmacy Med Name: Omeprazole Oral Capsule Delayed Release 20 MG] 90 capsule 0     Sig: Take 1 capsule (20 mg total) by mouth daily.       GI Medications Refill Protocol Passed - 10/29/2020  9:20 AM        Passed - PCP or prescribing provider visit in last 12 or next 3 months.     Last office visit with prescriber/PCP: Visit date not found OR same dept: Visit date not found OR same specialty: Visit date not found  Last physical: 11/6/2019 Last MTM visit: Visit date not found   Next visit within 3 mo: Visit date not found  Next physical within 3 mo: Visit date not found  Prescriber OR PCP: Rosana Yepez MD  Last diagnosis associated with med order: 1. Gastroesophageal reflux disease without esophagitis  - omeprazole (PRILOSEC) 20 MG capsule [Pharmacy Med Name: Omeprazole Oral Capsule Delayed Release 20 MG]; Take 1 capsule (20 mg total) by mouth daily.  Dispense: 90 capsule; Refill: 0    If protocol passes may refill for 12 months if within 3 months of last provider visit (or a total of 15 months).

## 2021-06-12 NOTE — PROGRESS NOTES
Assessment and Plan:     1. Medicare annual wellness visit, initial  At today's visit, we discussed lifestyle interventions to promote self-management and wellness, including maintenance of a healthy weight, healthy diet, regular physical activity and exercise, and falls prevention.  Immunizations reviewed, influenza vaccine given.  Referral made for bone density scan.  Will obtain fasting lipids and fasting glucose today.  She has an advanced healthcare directive.    2. Need for vaccination  - Influenza High Dose, Seasonal 65+ yrs    3. Gastroesophageal reflux disease without esophagitis  She will remain on omeprazole daily.    4. Insomnia  Chronic and stable.    5. Lichen Sclerosus Et Atrophicus  Encourage continued use of clobetasol, will add clotrimazole to this as well as I suspect it may be secondary yeast infection component.  Notify me with inadequate effect or worsening.    6. Urge Incontinence Of Urine  Continued significant symptoms despite previous surgeries and medication.  Encouraged her to do a trial off Detrol as it could be contributing to her constipation.  I advised at home attempting Kegel exercises.  Consider pelvic floor physical therapy.    7. Hyperlipidemia  Encouraged continued use of lovastatin.  Will obtain conference of metabolic panel.  Will obtain fasting lipid cascade today.  Encouraged healthy lifestyle habits.  - Comprehensive Metabolic Panel  - Lipid Cascade FASTING    8. Osteoporosis  Anna placed for follow-up bone density scan.  Will check vitamin D level today.  - Vitamin D, Total (25-Hydroxy)  - DXA Bone Density Scan; Future    9. Bright red rectal bleeding  Seems to be due to internal hemorrhoid by exam and by history.  Reassuring.  Advised avoidance of constipation with discontinuation of Detrol, adequate fiber intake, adequate fluid intake, and regular exercise.  Avoid straining with stools.  Over-the-counter hemorrhoid creams as needed.  Notify with return or  worsening.  - HM2(CBC w/o Differential)      The patient's current medical problems were reviewed.    The following health maintenance schedule was reviewed with the patient and provided in printed form in the after visit summary:   Health Maintenance   Topic Date Due     FALL RISK ASSESSMENT  03/14/2017     INFLUENZA VACCINE RULE BASED (1) 08/01/2017     DXA SCAN  09/02/2017     ADVANCE DIRECTIVES DISCUSSED WITH PATIENT  08/10/2021     TD 18+ HE  07/07/2025     PNEUMOCOCCAL POLYSACCHARIDE VACCINE AGE 65 AND OVER  Completed     PNEUMOCOCCAL CONJUGATE VACCINE FOR ADULTS (PCV13 OR PREVNAR)  Completed     ZOSTER VACCINE  Completed        Subjective:   Chief Complaint: An Haas is an 83 y.o. female here for an Annual Wellness visit.   HPI: Unfortunately she had a bout of rectal bleeding about 2 weeks ago.  Has a history of bright red blood on occasion on stool on the outside but not mixed in the stool.  Notes that she tends to have firm bowel movements.  They are not painful.  2 weeks ago had a firm bowel movement, had more blood than usual in the toilet and then as she stood up she actually had 2 drops of blood on the floor.  This is a change for her.  No bleeding since then.  She did not have any pain or stomach discomfort from it.    History of esophageal reflux for which he takes omeprazole daily with good effect.  Lichen sclerosis at atrophicus has been controlled with intermittent use of clobetasol for the most part she just tries to avoid using it.  Has had some intermittent labial itching and irritation in the rectovaginal region.  She is chronic insomnia with frequent awakening, finds it if she has family members staying with her she feels better.  She is not really interested in the PET at this time.  Takes no medication for this.  Long-standing history of urge incontinence for which she is taking Detrol.  Despite this is needing to wear a pad with intermittent urge incontinence still occurring despite  Detrol and previous vaginal mesh surgery.  She is frustrated by this.  Finds that the pads oftentimes will cause external irritation of her bottom, wondering about what she can do about this.  Known hyperlipidemia for which she takes lovastatin.  She has osteoporosis as well, due for follow-up bone density scan.    Review of Systems:   Please see above.  The rest of the review of systems are negative for all systems.    Patient Care Team:  Rosana Yepez MD as PCP - General  Amber OLIVER MD (Dermatology)  Dave Guajardo MD as Physician (Otolaryngology)     Patient Active Problem List   Diagnosis     Esophageal Reflux     Lichen Sclerosus Et Atrophicus     Insomnia     Urge Incontinence Of Urine     Arthritis     Hyperlipidemia     Alopecia     Osteoporosis     No past medical history on file.   Past Surgical History:   Procedure Laterality Date     BREAST BIOPSY       HYSTERECTOMY       OOPHORECTOMY      ?unknown with hysterectomy?     [place] APPENDECTOMY      Description: Appendectomy;  Recorded: 05/12/2008;     [place] LAP,SLING OPERATION      Description: Laparoscopic Sling Operation For Stress Incontinence;  Recorded: 05/12/2008;     [place] LAP,VAG HYST,UTERUS 250GMS/<      Description: Laparoscopy With Vaginal Hysterectomy;  Recorded: 06/02/2009;      Family History   Problem Relation Age of Onset     Endometrial cancer Mother      Cancer Mother       Social History     Social History     Marital status:      Spouse name: N/A     Number of children: N/A     Years of education: N/A     Occupational History     Not on file.     Social History Main Topics     Smoking status: Never Smoker     Smokeless tobacco: Not on file     Alcohol use Not on file     Drug use: Not on file     Sexual activity: Not on file     Other Topics Concern     Not on file     Social History Narrative      Current Outpatient Prescriptions   Medication Sig Dispense Refill     aspirin 81 MG EC tablet Take 81 mg by mouth daily.        "CALCIUM CARBONATE/VITAMIN D3 (CALCIUM 600 + D,3, ORAL) Take by mouth daily.       cholecalciferol, vitamin D3, (VITAMIN D3) 1,000 unit capsule Take 1,000 Units by mouth daily.       fluocinonide (LIDEX) 0.05 % ointment Apply a thin layer to affected area twice daily as needed. 30 g 3     lovastatin (MEVACOR) 40 MG tablet Take 2 tablets (80 mg total) by mouth at bedtime. 180 tablet 0     omeprazole (PRILOSEC) 20 MG capsule TAKE 1 CAPSULE (20 MG) BY ORAL ROUTE ONCE DAILY (Patient taking differently: as needed) 90 capsule 3     tolterodine (DETROL LA) 4 MG ER capsule TAKE ONE CAPSULE BY MOUTH ONE TIME DAILY  90 capsule 3     cholecalciferol, vitamin D3, (VITAMIN D3) 400 unit cap        clobetasol (TEMOVATE) 0.05 % ointment Apply to affected area twice daily as needed. 30 g 3     clotrimazole 1 % Oint Apply 1 Ointment topically 2 (two) times a day as needed. Apply thin layer to affected area twice daily as needed 60 g 4     No current facility-administered medications for this visit.       Objective:   Vital Signs:   Visit Vitals     /80 (Patient Site: Right Arm, Patient Position: Sitting, Cuff Size: Adult Regular)     Pulse 76     Temp 97.6  F (36.4  C) (Oral)     Resp 20     Ht 5' 2.5\" (1.588 m)     Wt 146 lb 14.4 oz (66.6 kg)     SpO2 98%     BMI 26.44 kg/m2        VisionScreening:  No exam data present     PHYSICAL EXAM  Physical Examination: General appearance - alert, well appearing, and in no distress, oriented to person, place, and time and normal appearing weight  Mental status - alert, oriented to person, place, and time  Eyes - pupils equal and reactive, extraocular eye movements intact  Ears - bilateral TM's and external ear canals normal  Nose - normal and patent, no erythema, discharge or polyps  Mouth - mucous membranes moist, pharynx normal without lesions  Neck - supple, no significant adenopathy, thyroid exam: thyroid is normal in size without nodules or tenderness  Lymphatics - no palpable " lymphadenopathy, no hepatosplenomegaly  Chest - clear to auscultation, no wheezes, rales or rhonchi, symmetric air entry  Heart - normal rate, regular rhythm, normal S1, S2, no murmurs, rubs, clicks or gallops  Abdomen - soft, nontender, nondistended, no masses or organomegaly  Breasts - breasts appear normal, no suspicious masses, no skin or nipple changes or axillary nodes  Pelvic -mild atrophy of the mucosa of the vulva.  She has some maceration of the skin of the perineal and perirectal region.  I do not appreciate any satellite lesions.  Rectal -  no masses, perirectal skin as noted above.  Anal speculum exam revealing large internal hemorrhoid at the 9 o'clock position with patient supine, appears as though there is recent bleeding.  She has a few scattered smaller internal hemorrhoids present that appear more benign.  No lesions, ulcerations, or masses.  Musculoskeletal - no joint tenderness, deformity or swelling  Extremities - peripheral pulses normal, no pedal edema, no clubbing or cyanosis, no pedal edema noted  Skin - normal coloration and turgor, no rashes, no suspicious skin lesions noted      Assessment Results 9/7/2017   Activities of Daily Living No help needed   Instrumental Activities of Daily Living No help needed   Some recent data might be hidden     A Mini-Cog score of 0-2 suggests the possibility of dementia, score of 3-5 suggests no dementia    Identified Health Risks:     She is at risk for lack of exercise and has been provided with information to increase physical activity for the benefit of her well-being.  Patient's advanced directive was discussed and I am comfortable with the patient's wishes.

## 2021-06-13 NOTE — TELEPHONE ENCOUNTER
FYI - Status Update  Who is Calling: Patient  Update: Patient did make an appointment with provider for 12/21/20 but will be out of lovastatin by 12/4/20.  Please send in refill today.  Okay to leave a detailed message?:  No return call needed

## 2021-06-13 NOTE — TELEPHONE ENCOUNTER
RN cannot approve Refill Request    RN can NOT refill this medication Protocol failed and NO refill given. Last office visit: Visit date not found Last Physical: 11/6/2019 Last MTM visit: Visit date not found Last visit same specialty: Visit date not found.  Next visit within 3 mo: Visit date not found  Next physical within 3 mo: Visit date not found      Shikha Lepe, [facility] Connection Triage/Med Refill 12/2/2020    Requested Prescriptions   Pending Prescriptions Disp Refills     lovastatin (MEVACOR) 40 MG tablet [Pharmacy Med Name: Lovastatin Oral Tablet 40 MG] 180 tablet 0     Sig: Take 2 tablets (80 mg total) by mouth at bedtime.       Statins Refill Protocol (Hmg CoA Reductase Inhibitors) Failed - 12/2/2020 11:25 AM        Failed - PCP or prescribing provider visit in past 12 months      Last office visit with prescriber/PCP: Visit date not found OR same dept: Visit date not found OR same specialty: Visit date not found  Last physical: 11/6/2019 Last MTM visit: Visit date not found   Next visit within 3 mo: Visit date not found  Next physical within 3 mo: Visit date not found  Prescriber OR PCP: Rosana Yepez MD  Last diagnosis associated with med order: 1. Hyperlipidemia, unspecified hyperlipidemia type  - lovastatin (MEVACOR) 40 MG tablet [Pharmacy Med Name: Lovastatin Oral Tablet 40 MG]; Take 2 tablets (80 mg total) by mouth at bedtime.  Dispense: 180 tablet; Refill: 0    If protocol passes may refill for 12 months if within 3 months of last provider visit (or a total of 15 months).

## 2021-06-13 NOTE — PROGRESS NOTES
Assessment and Plan:     1. Medicare annual wellness visit, subsequent  At today's visit, we discussed lifestyle interventions to promote self-management and wellness, including maintenance of a healthy weight, healthy diet, regular physical activity and exercise, and falls prevention.  Will obtain fasting lipids.  Will screen for diabetes with a fasting glucose.  She prefers to continue mammography, has an appointment scheduled later this month.  Up-to-date with immunizations.    2. Hyperlipidemia, unspecified hyperlipidemia type  Continue lovastatin.  Encouraged healthy lifestyle habits.  Will check lipids and a comprehensive metabolic panel and monitoring of medication.  - Comprehensive Metabolic Panel  - Lipid Cascade FASTING    3. Age-related osteoporosis without current pathological fracture  Remains on alendronate holiday.  Most recent bone density scan indicating osteopenia with high risk of fracture.  She is electing to forego treatments currently.  We will plan to obtain a vitamin D level and will check S bone density scan again next year.  Advised measures to reduce risk of falls.  With balance changes, advised consideration of physical therapy.  - Vitamin D, Total (25-Hydroxy)    4. Gastroesophageal reflux disease without esophagitis  Encouraged omeprazole, reducing dose as able.    5. Lichen Sclerosus Et Atrophicus  Continue clobetasol as needed.    6. Insomnia, unspecified type  Stable.    7. Urge Incontinence Of Urine  Inadequate control with tolterodine and I am concerned that there is some significant side effects.  I going to have her stop her tolterodine, focus on bowel function, and then will have her contact us in 2weeks with effect.    8. Bilateral primary osteoarthritis of knee  Stable.  Likely contributing to balance difficulties.    9. Balance problem  Nonspecific exam today though she does have some mild weakness in her quadricep muscles.  Advised consideration of physical therapy, she  will consider but declines referral today.    10. Word finding difficulty  No difficulties noted on exam today, no concerns from family.  Advised that she speak with family to see if they have concerns in regards to her memory or her behaviors.  Will check vitamin B12 level to ensure deficiency is not contributing.  - Vitamin B12    11. Slow transit constipation  Discontinue tolterodine.  Focus to increase dietary fiber intake with fresh fruit and vegetables, oral fluid intake, continue Metamucil, and update me in 2 weeks.    The patient's current medical problems were reviewed.    The following health maintenance schedule was reviewed with the patient and provided in printed form in the after visit summary:   Health Maintenance   Topic Date Due     MEDICARE ANNUAL WELLNESS VISIT  12/21/2021     FALL RISK ASSESSMENT  12/21/2021     ADVANCE CARE PLANNING  11/06/2024     TD 18+ HE  07/07/2025     Pneumococcal Vaccine: 65+ Years  Completed     INFLUENZA VACCINE RULE BASED  Completed     ZOSTER VACCINES  Completed     Pneumococcal Vaccine: Pediatrics (0 to 5 Years) and At-Risk Patients (6 to 64 Years)  Aged Out     HEPATITIS B VACCINES  Aged Out        Subjective:   Chief Complaint: An Haas is an 86 y.o. female here for an Annual Wellness visit.   HPI: She is been doing well for the most part over the past year.  Remains on lovastatin management of dyslipidemia.  Remains on omeprazole and management of esophageal reflux, trying to skip doses here and there as best she can.  History of lichen sclerosis at atrophicus, using clobetasol as needed when irritation occurs.  Insomnia has been stable using sleep hygiene techniques and good lifestyle.  She has struggles with urinary incontinence, seems to have worsened recently she is now awakening about every 2 hours overnight and notes that even though she has no urge when she is seated, soon if she stands up she will develop sudden urgency and frequently has some  leakage.  She is taking Detrol regularly.  Continued struggles with her bowels that she oftentimes will have small pebble-like stools, if she eats more prunes will then have a larger bowel movement that sometimes will be painful, require straining.  Taking Metamucil capsule.  Admits that she is drinking fewer fluids other than coffee.  She feels like she gets a good amount of vegetables but less fruit than usual.  Has been eating more sweets.  Denies any abdominal pain.  Known history of hemorrhoids.  Also struggles with ongoing balance issues, overall managing okay and has not had any falls but feels as though she is at risk.  Have offered physical therapy previously but she has not been interested.  Denies any overt dizziness.  Uses a handrail with stairs.  Patient also reports concerns about her memory as she is sometimes having difficulty recalling words or names of persons.  Her brother apparently was diagnosed with a non-Alzheimer's type of dementia, it is improved with medication.  She states no one else in her family or her social Pala are concerned about her memory.  History of osteoporosis, currently on alendronate holiday.    Review of Systems:   Please see above.  The rest of the review of systems are negative for all systems.    Patient Care Team:  Rosana Yepez MD as PCP - General  Amber Navarro MD (Dermatology)  Dave Guajardo MD as Physician (Otolaryngology)  Rosana Yepez MD as Assigned PCP     Patient Active Problem List   Diagnosis     Esophageal Reflux     Lichen Sclerosus Et Atrophicus     Insomnia     Urge Incontinence Of Urine     Arthritis     Hyperlipidemia     Osteoporosis     Bilateral primary osteoarthritis of knee     Balance problem     No past medical history on file.   Past Surgical History:   Procedure Laterality Date     BREAST BIOPSY       HYSTERECTOMY       OOPHORECTOMY      ?unknown with hysterectomy?     UT APPENDECTOMY      Description: Appendectomy;   Recorded: 05/12/2008;     [place] LAP,SLING OPERATION      Description: Laparoscopic Sling Operation For Stress Incontinence;  Recorded: 05/12/2008;     [place] LAP,VAG HYST,UTERUS 250GMS/<      Description: Laparoscopy With Vaginal Hysterectomy;  Recorded: 06/02/2009;      Family History   Problem Relation Age of Onset     Endometrial cancer Mother      Cancer Mother       Social History     Socioeconomic History     Marital status:      Spouse name: Not on file     Number of children: Not on file     Years of education: Not on file     Highest education level: Not on file   Occupational History     Not on file   Social Needs     Financial resource strain: Not on file     Food insecurity     Worry: Not on file     Inability: Not on file     Transportation needs     Medical: Not on file     Non-medical: Not on file   Tobacco Use     Smoking status: Never Smoker     Smokeless tobacco: Never Used   Substance and Sexual Activity     Alcohol use: Not on file     Drug use: Not on file     Sexual activity: Not on file   Lifestyle     Physical activity     Days per week: Not on file     Minutes per session: Not on file     Stress: Not on file   Relationships     Social connections     Talks on phone: Not on file     Gets together: Not on file     Attends Rastafari service: Not on file     Active member of club or organization: Not on file     Attends meetings of clubs or organizations: Not on file     Relationship status: Not on file     Intimate partner violence     Fear of current or ex partner: Not on file     Emotionally abused: Not on file     Physically abused: Not on file     Forced sexual activity: Not on file   Other Topics Concern     Not on file   Social History Narrative     Not on file      Current Outpatient Medications   Medication Sig Dispense Refill     aspirin 81 MG EC tablet Take 81 mg by mouth daily.       CALCIUM CARBONATE/VITAMIN D3 (CALCIUM 600 + D,3, ORAL) Take by mouth daily. Calcium 600mg Vit D3  "20mcg       cholecalciferol, vitamin D3, (VITAMIN D3) 10 mcg (400 unit) cap        lovastatin (MEVACOR) 40 MG tablet Take 2 tablets (80 mg total) by mouth at bedtime. 180 tablet 0     omeprazole (PRILOSEC) 20 MG capsule Take 1 capsule (20 mg total) by mouth daily. 90 capsule 0     psyllium husk (METAMUCIL ORAL) Take by mouth.       tolterodine (DETROL LA) 4 MG ER capsule Take 1 capsule (4 mg total) by mouth daily. 90 capsule 4     clobetasol (TEMOVATE) 0.05 % ointment Apply to affected area twice daily as needed. 30 g 3     No current facility-administered medications for this visit.       Objective:   Vital Signs:   Visit Vitals  /78   Pulse 75   Temp 98.7  F (37.1  C) (Oral)   Resp 20   Ht 5' 3\" (1.6 m)   Wt 151 lb 4.8 oz (68.6 kg)   SpO2 97%   BMI 26.80 kg/m           VisionScreening:  No exam data present     PHYSICAL EXAM  Physical Examination: General appearance - alert, well appearing, and in no distress, oriented to person, place, and time and normal appearing weight  Mental status - alert, oriented to person, place, and time, normal mood, behavior, speech, dress, motor activity, and thought processes  Eyes - pupils equal and reactive, extraocular eye movements intact  Ears - bilateral TM's and external ear canals normal  Nose - normal and patent, no erythema, discharge or polyps  Mouth - mucous membranes moist, pharynx normal without lesions  Neck - supple, no significant adenopathy  Lymphatics - no palpable lymphadenopathy, no hepatosplenomegaly  Chest - clear to auscultation, no wheezes, rales or rhonchi, symmetric air entry  Heart - normal rate, regular rhythm, normal S1, S2, no murmurs, rubs, clicks or gallops  Abdomen - soft, nontender, nondistended, no masses or organomegaly  Neurological - alert, oriented, normal speech, no focal findings or movement disorder noted; 5 out of 5 strength in all 4 extremities.  Rapid alternating movements and finger-nose-finger testing intact.  Romberg negative.  " Symmetric deep tendon reflexes.  Face moves symmetrically.  Musculoskeletal - no joint tenderness, deformity or swelling  Extremities - peripheral pulses normal, no pedal edema, no clubbing or cyanosis  Skin - normal coloration and turgor, no rashes, no suspicious skin lesions noted      Assessment Results 12/21/2020   Activities of Daily Living No help needed   Instrumental Activities of Daily Living No help needed   Mini Cog Total Score 5   Some recent data might be hidden     A Mini-Cog score of 0-2 suggests the possibility of dementia, score of 3-5 suggests no dementia    Identified Health Risks:     She is at risk for lack of exercise and has been provided with information to increase physical activity for the benefit of her well-being.  The patient was counseled and encouraged to consider modifying their diet and eating habits. She was provided with information on recommended healthy diet options.  Information on urinary incontinence and treatment options given to patient.  Patient's advanced directive was discussed and I am comfortable with the patient's wishes.

## 2021-06-14 NOTE — TELEPHONE ENCOUNTER
Refill Approved    Rx renewed per Medication Renewal Policy. Medication was last renewed on 11/6/19, last OV 12/21/20.    Leeanna Mercado, [facility] Triage/Med Refill 1/30/2021     Requested Prescriptions   Pending Prescriptions Disp Refills     omeprazole (PRILOSEC) 20 MG capsule [Pharmacy Med Name: Omeprazole Oral Capsule Delayed Release 20 MG] 90 capsule 0     Sig: Take 1 capsule (20 mg total) by mouth daily.       GI Medications Refill Protocol Passed - 1/29/2021  9:08 AM        Passed - PCP or prescribing provider visit in last 12 or next 3 months.     Last office visit with prescriber/PCP: Visit date not found OR same dept: Visit date not found OR same specialty: Visit date not found  Last physical: 12/21/2020 Last MTM visit: Visit date not found   Next visit within 3 mo: Visit date not found  Next physical within 3 mo: Visit date not found  Prescriber OR PCP: Rosana Yepez MD  Last diagnosis associated with med order: 1. Gastroesophageal reflux disease without esophagitis  - omeprazole (PRILOSEC) 20 MG capsule [Pharmacy Med Name: Omeprazole Oral Capsule Delayed Release 20 MG]; Take 1 capsule (20 mg total) by mouth daily.  Dispense: 90 capsule; Refill: 0    2. Urge Incontinence Of Urine  - tolterodine (DETROL LA) 4 MG ER capsule [Pharmacy Med Name: Tolterodine Tartrate ER Oral Capsule Extended Release 24 Hour 4 MG]; Take 1 capsule (4 mg total) by mouth daily.  Dispense: 90 capsule; Refill: 0    If protocol passes may refill for 12 months if within 3 months of last provider visit (or a total of 15 months).                tolterodine (DETROL LA) 4 MG ER capsule [Pharmacy Med Name: Tolterodine Tartrate ER Oral Capsule Extended Release 24 Hour 4 MG] 90 capsule 0     Sig: Take 1 capsule (4 mg total) by mouth daily.       Urinary Incontinence Medications Refill Protocol Passed - 1/29/2021  9:08 AM        Passed - PCP or prescribing provider visit in past 12 months       Last office visit with  prescriber/PCP: Visit date not found OR same dept: Visit date not found OR same specialty: Visit date not found  Last physical: 12/21/2020 Last MTM visit: Visit date not found   Next visit within 3 mo: Visit date not found  Next physical within 3 mo: Visit date not found  Prescriber OR PCP: Rosana Yepez MD  Last diagnosis associated with med order: 1. Gastroesophageal reflux disease without esophagitis  - omeprazole (PRILOSEC) 20 MG capsule [Pharmacy Med Name: Omeprazole Oral Capsule Delayed Release 20 MG]; Take 1 capsule (20 mg total) by mouth daily.  Dispense: 90 capsule; Refill: 0    2. Urge Incontinence Of Urine  - tolterodine (DETROL LA) 4 MG ER capsule [Pharmacy Med Name: Tolterodine Tartrate ER Oral Capsule Extended Release 24 Hour 4 MG]; Take 1 capsule (4 mg total) by mouth daily.  Dispense: 90 capsule; Refill: 0    If protocol passes may refill for 12 months if within 3 months of last provider visit (or a total of 15 months).

## 2021-06-15 NOTE — TELEPHONE ENCOUNTER
Refill Approved    Rx renewed per Medication Renewal Policy. Medication was last renewed on 12/2/20, last OV 12/21/20.    Leeanna Mercado, [facility] Triage/Med Refill 3/7/2021     Requested Prescriptions   Pending Prescriptions Disp Refills     lovastatin (MEVACOR) 40 MG tablet [Pharmacy Med Name: Lovastatin Oral Tablet 40 MG] 180 tablet 0     Sig: TAKE TWO TABLETS BY MOUTH DAILY AT BEDTIME       Statins Refill Protocol (Hmg CoA Reductase Inhibitors) Passed - 3/7/2021 11:56 AM        Passed - PCP or prescribing provider visit in past 12 months      Last office visit with prescriber/PCP: Visit date not found OR same dept: Visit date not found OR same specialty: Visit date not found  Last physical: Visit date not found Last MTM visit: Visit date not found   Next visit within 3 mo: Visit date not found  Next physical within 3 mo: Visit date not found  Prescriber OR PCP: Erica Douglass MD  Last diagnosis associated with med order: 1. Hyperlipidemia, unspecified hyperlipidemia type  - lovastatin (MEVACOR) 40 MG tablet [Pharmacy Med Name: Lovastatin Oral Tablet 40 MG]; TAKE TWO TABLETS BY MOUTH DAILY AT BEDTIME   Dispense: 180 tablet; Refill: 0    If protocol passes may refill for 12 months if within 3 months of last provider visit (or a total of 15 months).

## 2021-06-16 PROBLEM — R26.89 BALANCE PROBLEM: Status: ACTIVE | Noted: 2018-11-11

## 2021-06-16 PROBLEM — M17.0 BILATERAL PRIMARY OSTEOARTHRITIS OF KNEE: Status: ACTIVE | Noted: 2018-11-11

## 2021-06-17 NOTE — PATIENT INSTRUCTIONS - HE
Patient Instructions by Rosana Yepez MD at 11/6/2019  9:10 AM     Author: Rosana Yepez MD Service: -- Author Type: Physician    Filed: 11/6/2019 10:34 AM Encounter Date: 11/6/2019 Status: Addendum    : Rosana Yepez MD (Physician)    Related Notes: Original Note by Rosana Yepez MD (Physician) filed at 11/6/2019 10:31 AM       Stop Myrbetric, and let's instead try Detrol to see if it works as well or better.    Do your best to drink plenty of fluids.  Consider adding a stool softener like docusate (Colace) to help with constipation.    Consider pelvic floor physical therapy, and let me know if you'd like to start it.  Please also consider physical therapy to help with your balance.    Try reducing omeprazole to every other day.  If your heart burn picks up, you can return to daily omeprazole.      Patient Education     Exercise for a Healthier Heart  You may wonder how you can improve the health of your heart. If youre thinking about exercise, youre on the right track. You dont need to become an athlete, but you do need a certain amount of brisk exercise to help strengthen your heart. If you have been diagnosed with a heart condition, your doctor may recommend exercise to help stabilize your condition. To help make exercise a habit, choose safe, fun activities.       Be sure to check with your health care provider before starting an exercise program.    Why exercise?  Exercising regularly offers many healthy rewards. It can help you do all of the following:    Improve your blood cholesterol levels to help prevent further heart trouble    Lower your blood pressure to help prevent a stroke or heart attack    Control diabetes, or reduce your risk of getting this disease    Improve your heart and lung function    Reach and maintain a healthy weight    Make your muscles stronger and more limber so you can stay active    Prevent falls and fractures by slowing the loss of bone mass  (osteoporosis)    Manage stress better  Exercise tips  Ease into your routine. Set small goals. Then build on them.  Exercise on most days. Aim for a total of 150 or more minutes of moderate to  vigorous intensity activity each week. Consider 40 minutes, 3 to 4 times a week. For best results, activity should last for 40 minutes on average. It is OK to work up to the 40 minute period over time. Examples of moderate-intensity activity is walking one mile in 15 minutes or 30 to 45 minutes of yard work.  Step up your daily activity level. Along with your exercise program, try being more active throughout the day. Walk instead of drive. Do more household tasks or yard work.  Choose one or more activities you enjoy. Walking is one of the easiest things you can do. You can also try swimming, riding a bike, or taking an exercise class.  Stop exercising and call your doctor if you:    Have chest pain or feel dizzy or lightheaded    Feel burning, tightness, pressure, or heaviness in your chest, neck, shoulders, back, or arms    Have unusual shortness of breath    Have increased joint or muscle pain    Have palpitations or an irregular heartbeat      1448-8674 [employer]. [address] [place], [place] [id number]. All rights reserved. This information is not intended as a substitute for professional medical care. Always follow your healthcare professional's instructions.         Patient Education   Urinary Incontinence, Female (Adult)  Urinary incontinence means loss of control of the bladder. This problem affects many women, especially as they get older. If you have incontinence, you may be embarrassed to ask for help. But know that this problem can be treated.  Types of Incontinence  There are different types of incontinence. Two of the main types are described here. You can have more than one type.    Stress incontinence. With this type, urine leaks when pressure (stress) is put on the bladder. This may  happen when you cough, sneeze, or laugh. Stress incontinence most often occurs because the pelvic floor muscles that support the bladder and urethra are weak. This can happen after pregnancy and vaginal childbirth or a hysterectomy. It can also be due to excess body weight or hormone changes.    Urge incontinence (also called overactive bladder). With this type, a sudden urge to urinate is felt often. This may happen even though there may not be much urine in the bladder. The need to urinate often during the night is common. Urge incontinence most often occurs because of bladder spasms. This may be due to bladder irritation or infection. Damage to bladder nerves or pelvic muscles, constipation, and certain medicines can also lead to urge incontinence.  Treatment of urinary incontinence depends on the cause. Further evaluation is needed to find the type you have. This will likely include an exam and certain tests. Based on the results, you and your healthcare provider can then plan treatment. Until a diagnosis is made, the home care tips below can help relieve symptoms.  Home care    Do pelvic floor muscle exercises, if they are prescribed. The pelvic floor muscles help support the bladder and urethra. Many women find that their symptoms improve when doing special exercises that strengthen these muscles. To do the exercises contract the muscles you would use to stop your stream of urine, but do this when youre not urinating. Hold for 10 seconds, then relax. Repeat 10 to 20 times in a row, at least 3 times a day. Your provider may give you other instructions for how to do the exercises and how often.    Keep a bladder diary. This helps track how often and how much you urinate over a set period of time. Bring this diary with you to your next visit with the provider. The information can help your provider learn more about your bladder problem.    Lose weight, if advised to by your provider. Excess weight puts pressure  on the bladder. Your provider can help you create a weight-loss plan thats right for you. This may include exercising more and making certain diet changes.    Don't consume foods and drinks that may irritate the bladder. These can include alcohol and caffeinated drinks.    Quit smoking. Smoking and other tobacco use can lead to chronic cough that strains the pelvic floor muscles. Smoking may also damage the bladder and urethra. Talk with your provider about treatments or methods you can use to quit smoking.    If drinking large amounts of fluid causes you to have symptoms, you may be advised to limit your fluid intake. You may also be advised to drink most of your fluids during the day and to limit fluids at night.    If youre worried about urine leakage or accidents, you may wear absorbent pads to catch urine. Change the pads often. This helps reduce discomfort. It may also reduce the risk of skin or bladder infections.  Follow-up care  Follow up with your healthcare provider, or as directed. It may take some to find the right treatment for your problem. Your treatment plan may include special therapies or medicines. Certain procedures or surgery may also be options. Be sure to discuss any questions you have with your provider.  When to seek medical advice  Call the healthcare provider right away if any of these occur:    Fever of 100.4 F (38 C) or higher, or as directed by your provider    Bladder pain or fullness    Abdominal swelling    Nausea or vomiting    Back pain    Weakness, dizziness or fainting  Date Last Reviewed: 10/1/2017    [id number] [employer]. [address] [place] [address], [place] [address]. All rights reserved. This information is not intended as a substitute for professional medical care. Always follow your healthcare professional's instructions.       Advance Directive  Patients advance directive was discussed and I am comfortable with the patients wishes.  Patient Education    Personalized Prevention Plan  You are due for the preventive services outlined below.  Your care team is available to assist you in scheduling these services.  If you have already completed any of these items, please share that information with your care team to update in your medical record.  Health Maintenance   Topic Date Due   ? ZOSTER VACCINES (2 of 3) 09/06/2007   ? INFLUENZA VACCINE RULE BASED (1) 08/01/2019   ? DXA SCAN  09/21/2019   ? FALL RISK ASSESSMENT  10/31/2019   ? MEDICARE ANNUAL WELLNESS VISIT  10/31/2019   ? ADVANCE CARE PLANNING  10/31/2023   ? TD 18+ HE  07/07/2025   ? PNEUMOCOCCAL IMMUNIZATION 65+ LOW/MEDIUM RISK  Completed

## 2021-06-18 NOTE — PATIENT INSTRUCTIONS - HE
Patient Instructions by Rosana Yepez MD at 12/21/2020 10:10 AM     Author: Rosana Yepez MD Service: -- Author Type: Physician    Filed: 12/21/2020 11:01 AM Encounter Date: 12/21/2020 Status: Addendum    : Rosana Yepez MD (Physician)    Related Notes: Original Note by Rosana Yepez MD (Physician) filed at 12/21/2020 10:59 AM       I am concerned that constipation is causing your bladder function to be worse.  Tolterodine (Detrol) can worsen constipation which can lead to worsen bladder function.  I recommend that you stop tolterodine.  Continue fiber supplement such as Metamucil daily.  Work to increase your intake of fruits and vegetables.  Work to increase your intake of fluids.  Update me with your bowel function and your bladder function in 2 weeks.  If you are noticing sudden worsening, let me know before then.    Talk to your family members about your concerns regarding your memory.  Your exam today in clinic is normal.  If your family members are noticing changes or concerns, I recommend you schedule a follow-up visit with me, bring one of them with you.      Patient Education     Exercise for a Healthier Heart  You may wonder how you can improve the health of your heart. If youre thinking about exercise, youre on the right track. You dont need to become an athlete, but you do need a certain amount of brisk exercise to help strengthen your heart. If you have been diagnosed with a heart condition, your doctor may recommend exercise to help stabilize your condition. To help make exercise a habit, choose safe, fun activities.       Be sure to check with your health care provider before starting an exercise program.    Why exercise?  Exercising regularly offers many healthy rewards. It can help you do all of the following:    Improve your blood cholesterol levels to help prevent further heart trouble    Lower your blood pressure to help prevent a stroke or heart attack    Control  diabetes, or reduce your risk of getting this disease    Improve your heart and lung function    Reach and maintain a healthy weight    Make your muscles stronger and more limber so you can stay active    Prevent falls and fractures by slowing the loss of bone mass (osteoporosis)    Manage stress better  Exercise tips  Ease into your routine. Set small goals. Then build on them.  Exercise on most days. Aim for a total of 150 or more minutes of moderate to  vigorous intensity activity each week. Consider 40 minutes, 3 to 4 times a week. For best results, activity should last for 40 minutes on average. It is OK to work up to the 40 minute period over time. Examples of moderate-intensity activity is walking one mile in 15 minutes or 30 to 45 minutes of yard work.  Step up your daily activity level. Along with your exercise program, try being more active throughout the day. Walk instead of drive. Do more household tasks or yard work.  Choose one or more activities you enjoy. Walking is one of the easiest things you can do. You can also try swimming, riding a bike, or taking an exercise class.  Stop exercising and call your doctor if you:    Have chest pain or feel dizzy or lightheaded    Feel burning, tightness, pressure, or heaviness in your chest, neck, shoulders, back, or arms    Have unusual shortness of breath    Have increased joint or muscle pain    Have palpitations or an irregular heartbeat      1530-1329 [employer]. [address] [place] [address]. All rights reserved. This information is not intended as a substitute for professional medical care. Always follow your healthcare professional's instructions.         Patient Education   Understanding USDA MyPlate  The USDA (US Department of Agriculture) has guidelines to help you make healthy food choices. These are called MyPlate. MyPlate shows the food groups that make up healthy meals using the image of a place setting. Before you eat,  think about the healthiest choices for what to put onto your plate or into your cup or bowl. To learn more about building a healthy plate, visit www.choosemyplate.gov.       The Food Groups    Fruits: Any fruit or 100% fruit juice counts as part of the Fruit Group. Fruits may be fresh, canned, frozen, or dried, and may be whole, cut-up, or pureed. Make half your plate fruits and vegetables.    Vegetables: Any vegetable or 100% vegetable juice counts as a member of the Vegetable Group. Vegetables may be fresh, frozen, canned, or dried. They can be served raw or cooked and may be whole, cut-up, or mashed. Make half your plate fruits and vegetables.     Grains: All foods made from grains are part of the Grains Group. These include wheat, rice, oats, cornmeal, and barley such as bread, pasta, oatmeal, cereal, tortillas, and grits. Grains should be no more than a quarter of your plate. At least half of your grains should be whole grains.    Protein: This group includes meat, poultry, seafood, beans and peas, eggs, processed soy products (like tofu), nuts (including nut butters), and seeds. Make protein choices no more than a quarter of your plate. Meat and poultry choices should be lean or low fat.    Dairy: All fluid milk products and foods made from milk that contain calcium, like yogurt and cheese are part of the Dairy Group. (Foods that have little calcium, such as cream, butter, and cream cheese, are not part of the group.) Most dairy choices should be low-fat or fat-free.    Oils: These are fats that are liquid at room temperature. They include canola, corn, olive, soybean, and sunflower oil. Foods that are mainly oil include mayonnaise, certain salad dressings, and soft margarines. You should have only 5 to 7 teaspoons of oils a day. You probably already get this much from the food you eat.  Use [employer] to Help Build Your Meals  The [employer] can help you plan and track your meals and activity. You can  look up individual foods to see or compare their nutritional value. You can get guidelines for what and how much you should eat. You can compare your food choices. And you can assess personal physical activities and see ways you can improve. Go to www.[employer].gov/supertracker/.    5365-7386 [employer]. [address] [place] [address], [place] [address]. All rights reserved. This information is not intended as a substitute for professional medical care. Always follow your healthcare professional's instructions.           Patient Education   Urinary Incontinence, Female (Adult)  Urinary incontinence means loss of control of the bladder. This problem affects many women, especially as they get older. If you have incontinence, you may be embarrassed to ask for help. But know that this problem can be treated.  Types of Incontinence  There are different types of incontinence. Two of the main types are described here. You can have more than one type.    Stress incontinence. With this type, urine leaks when pressure (stress) is put on the bladder. This may happen when you cough, sneeze, or laugh. Stress incontinence most often occurs because the pelvic floor muscles that support the bladder and urethra are weak. This can happen after pregnancy and vaginal childbirth or a hysterectomy. It can also be due to excess body weight or hormone changes.    Urge incontinence (also called overactive bladder). With this type, a sudden urge to urinate is felt often. This may happen even though there may not be much urine in the bladder. The need to urinate often during the night is common. Urge incontinence most often occurs because of bladder spasms. This may be due to bladder irritation or infection. Damage to bladder nerves or pelvic muscles, constipation, and certain medicines can also lead to urge incontinence.  Treatment of urinary incontinence depends on the cause. Further evaluation is needed to find the type you  have. This will likely include an exam and certain tests. Based on the results, you and your healthcare provider can then plan treatment. Until a diagnosis is made, the home care tips below can help relieve symptoms.  Home care    Do pelvic floor muscle exercises, if they are prescribed. The pelvic floor muscles help support the bladder and urethra. Many women find that their symptoms improve when doing special exercises that strengthen these muscles. To do the exercises contract the muscles you would use to stop your stream of urine, but do this when youre not urinating. Hold for 10 seconds, then relax. Repeat 10 to 20 times in a row, at least 3 times a day. Your provider may give you other instructions for how to do the exercises and how often.    Keep a bladder diary. This helps track how often and how much you urinate over a set period of time. Bring this diary with you to your next visit with the provider. The information can help your provider learn more about your bladder problem.    Lose weight, if advised to by your provider. Excess weight puts pressure on the bladder. Your provider can help you create a weight-loss plan thats right for you. This may include exercising more and making certain diet changes.    Don't consume foods and drinks that may irritate the bladder. These can include alcohol and caffeinated drinks.    Quit smoking. Smoking and other tobacco use can lead to chronic cough that strains the pelvic floor muscles. Smoking may also damage the bladder and urethra. Talk with your provider about treatments or methods you can use to quit smoking.    If drinking large amounts of fluid causes you to have symptoms, you may be advised to limit your fluid intake. You may also be advised to drink most of your fluids during the day and to limit fluids at night.    If youre worried about urine leakage or accidents, you may wear absorbent pads to catch urine. Change the pads often. This helps reduce  discomfort. It may also reduce the risk of skin or bladder infections.  Follow-up care  Follow up with your healthcare provider, or as directed. It may take some to find the right treatment for your problem. Your treatment plan may include special therapies or medicines. Certain procedures or surgery may also be options. Be sure to discuss any questions you have with your provider.  When to seek medical advice  Call the healthcare provider right away if any of these occur:    Fever of 100.4 F (38 C) or higher, or as directed by your provider    Bladder pain or fullness    Abdominal swelling    Nausea or vomiting    Back pain    Weakness, dizziness or fainting  Date Last Reviewed: 10/1/2017    9776-5023 [employer]. [address] [place] [address], [place] [address]. All rights reserved. This information is not intended as a substitute for professional medical care. Always follow your healthcare professional's instructions.       Advance Directive  Patients advance directive was discussed and I am comfortable with the patients wishes.  Patient Education   Personalized Prevention Plan  You are due for the preventive services outlined below.  Your care team is available to assist you in scheduling these services.  If you have already completed any of these items, please share that information with your care team to update in your medical record.  Health Maintenance   Topic Date Due   ? MEDICARE ANNUAL WELLNESS VISIT  12/21/2021   ? FALL RISK ASSESSMENT  12/21/2021   ? ADVANCE CARE PLANNING  11/06/2024   ? TD 18+ HE  07/07/2025   ? Pneumococcal Vaccine: 65+ Years  Completed   ? INFLUENZA VACCINE RULE BASED  Completed   ? ZOSTER VACCINES  Completed   ? Pneumococcal Vaccine: Pediatrics (0 to 5 Years) and At-Risk Patients (6 to 64 Years)  Aged Out   ? HEPATITIS B VACCINES  Aged Out

## 2021-06-19 NOTE — LETTER
Letter by Rosana Yepez MD at      Author: Rosana Yepez MD Service: -- Author Type: --    Filed:  Encounter Date: 11/16/2019 Status: Signed         An Haas  [address] [facility] [address]             November 16, 2019        Dear Ms. Haas,    Below are the results from your recent visit:    Your bone density remains low on your recent scan, but it is stable.      Please call with questions or contact us using [employer].    Sincerely,        Electronically signed by Rosana Yepez MD

## 2021-06-19 NOTE — LETTER
Letter by Rosana Yepez MD at      Author: Rosana Yepez MD Service: -- Author Type: --    Filed:  Encounter Date: 11/15/2019 Status: Signed         An Haas  [address] [place] [address]             November 15, 2019        Dear Ms. Haas,    Below are the results from your recent visit:    Resulted Orders   Lipid [place] FASTING   Result Value Ref Range    Cholesterol 160 <=199 mg/dL    Triglycerides 88 <=149 mg/dL    HDL Cholesterol 51 >=50 mg/dL    LDL Calculated 91 <=129 mg/dL    Patient Fasting > 8hrs? Yes    Renal Function Profile   Result Value Ref Range    Albumin 4.1 3.5 - 5.0 g/dL    Calcium 9.8 8.5 - 10.5 mg/dL    Phosphorus 3.7 2.5 - 4.5 mg/dL    Glucose 79 70 - 125 mg/dL    BUN 16 8 - 28 mg/dL    Creatinine 0.77 0.60 - 1.10 mg/dL    Sodium 146 (H) 136 - 145 mmol/L    Potassium 4.4 3.5 - 5.0 mmol/L    Chloride 109 (H) 98 - 107 mmol/L    CO2 27 22 - 31 mmol/L    Anion Gap, Calculation 10 5 - 18 mmol/L    GFR MDRD Af Amer >60 >60 mL/min/1.73m2    GFR MDRD Non Af Amer >60 >60 mL/min/1.73m2    Narrative    Fasting Glucose reference range is 70-99 mg/dL per  American Diabetes Association (ADA) guidelines.   Parathyroid Hormone Intact   Result Value Ref Range    PTH 52 10 - 86 pg/mL   Vitamin D, Total (25-Hydroxy)   Result Value Ref Range    Vitamin D, Total (25-Hydroxy) 44.9 30.0 - 80.0 ng/mL    Narrative    Deficiency <10.0 ng/mL  Insufficiency 10.0-29.9 ng/mL  Sufficiency 30.0-80.0 ng/mL  Toxicity (possible) >100.0 ng/mL   Thyroid Cascade   Result Value Ref Range    TSH 2.11 0.30 - 5.00 uIU/mL       Your cholesterol looks good overall. Normal kidney function, and your electrolytes look OK.  Normal thyroid and parathyroid levels.  Normal vitamin D level.      Please call with questions or contact us using [employer].    Sincerely,        Electronically signed by Rosana Yepez MD

## 2021-06-21 NOTE — PROGRESS NOTES
Assessment and Plan:     1. Annual Wellness Visit  At today's visit, we discussed lifestyle interventions to promote self-management and wellness, including maintenance of a healthy weight, healthy diet, regular physical activity and exercise, and falls prevention.  Advanced healthcare directive is in record.  Up-to-date with routine cancer screening.  Immunizations reviewed, consider Shingrix.  Otherwise up-to-date.    1. Gastroesophageal reflux disease without esophagitis  Stable.  Advised that if she is having reflux symptoms more than twice weekly that she consider taking omeprazole every other day.    2. Hyperlipidemia  Table.  Encouraged healthy lifestyle habits.  Continue lovastatin.  Will check fasting lipids and conference of metabolic panel today.  - Lipid [place] FASTING  - Comprehensive Metabolic Panel    3. Insomnia, unspecified  We reviewed good sleep hygiene.  I recommend that she discontinue caffeine intake in the latter half of the day.  Initiate regular exercise.  Written information provided regarding sleep hygiene.    4. Osteoporosis  Bone density scan last year signifying osteoporosis with high risk of fracture, patient would like to repeat a bone density scan next year and then consider initiation of medication.  Will check vitamin D level today.  - Vitamin D, Total (25-Hydroxy)    5. Lichen Sclerosus Et Atrophicus  We will continue to work with her gynecologist.    6. Urge Incontinence Of Urine  She will continue to work with her gynecologist.    7. Balance problem  Specific.  Referral placed to physical therapy.    8. Bilateral primary osteoarthritis of knees  She may try over-the-counter hyaluronic acid, also discussed omega-3 fatty acids, apple cider vinegar, and tart cherry juice as options.  Encouraged regular physical activity.  Physical therapy may also assist with this.    The patient's current medical problems were reviewed.    I have had an Advance Directives discussion with the  patient.  The following health maintenance schedule was reviewed with the patient and provided in printed form in the after visit summary:   Health Maintenance   Topic Date Due     FALL RISK ASSESSMENT  03/14/2017     DXA SCAN  09/21/2019     ADVANCE DIRECTIVES DISCUSSED WITH PATIENT  09/07/2022     TD 18+ HE  07/07/2025     PNEUMOCOCCAL POLYSACCHARIDE VACCINE AGE 65 AND OVER  Completed     INFLUENZA VACCINE RULE BASED  Completed     PNEUMOCOCCAL CONJUGATE VACCINE FOR ADULTS (PCV13 OR PREVNAR)  Completed     ZOSTER VACCINE  Completed        Subjective:   Chief Complaint: An Haas is an 84 y.o. female here for an Annual Wellness visit.   HPI: She has been feeling well overall over the last year.  Frustration with insomnia, awakening frequently.  Some difficulties with onset of sleep as well.  Describes drinking caffeinated beverages throughout the day including in the evening.  Has not tried eliminating this.  Walking about twice weekly for exercise.  Taking Prilosec as needed, perhaps 20 times per week or occasional Maalox or Tums for reflux, that is doing well.  History of osteoporosis, stable on bone density scan September 2017.  She has a history of lichen sclerosis and urinary urge incontinence managed by Dr. Meng of OB gynecology.  She has topical corticosteroids and also is receiving pelvic floor physical therapy.  Remains on lovastatin for hyperlipidemia.    Noting bilateral aching in her knees, no real dysfunction otherwise, considering adding hyaluronic acid.    Balance changes and so she is cautious and watches her self.  Describes mild reduction in left ear hearing which is ongoing, no ringing.    Review of Systems:   Please see above.  The rest of the review of systems are negative for all systems.    Patient Care Team:  Rosana Yepez MD as PCP - General  Amber Navarro MD (Dermatology)  Dave Guajardo MD as Physician (Otolaryngology)     Patient Active Problem List   Diagnosis      Esophageal Reflux     Lichen Sclerosus Et Atrophicus     Insomnia     Urge Incontinence Of Urine     Arthritis     Hyperlipidemia     Alopecia     Osteoporosis     No past medical history on file.   Past Surgical History:   Procedure Laterality Date     BREAST BIOPSY       HYSTERECTOMY       OOPHORECTOMY      ?unknown with hysterectomy?     [place] APPENDECTOMY      Description: Appendectomy;  Recorded: 05/12/2008;     [place] LAP,SLING OPERATION      Description: Laparoscopic Sling Operation For Stress Incontinence;  Recorded: 05/12/2008;     [place] LAP,VAG HYST,UTERUS 250GMS/<      Description: Laparoscopy With Vaginal Hysterectomy;  Recorded: 06/02/2009;      Family History   Problem Relation Age of Onset     Endometrial cancer Mother      Cancer Mother       Social History     Social History     Marital status:      Spouse name: N/A     Number of children: N/A     Years of education: N/A     Occupational History     Not on file.     Social History Main Topics     Smoking status: Never Smoker     Smokeless tobacco: Never Used     Alcohol use Not on file     Drug use: Not on file     Sexual activity: Not on file     Other Topics Concern     Not on file     Social History Narrative      Current Outpatient Prescriptions   Medication Sig Dispense Refill     aspirin 81 MG EC tablet Take 81 mg by mouth daily.       CALCIUM CARBONATE/VITAMIN D3 (CALCIUM 600 + D,3, ORAL) Take by mouth daily.       cholecalciferol, vitamin D3, (VITAMIN D3) 400 unit cap        clobetasol (TEMOVATE) 0.05 % ointment Apply to affected area twice daily as needed. 30 g 3     clotrimazole (LOTRIMIN) 1 % cream Apply topically 2 (two) times a day as needed. 60 g 0     clotrimazole 1 % Oint Apply 1 Ointment topically 2 (two) times a day as needed. Apply thin layer to affected area twice daily as needed 60 g 4     fluocinonide (LIDEX) 0.05 % ointment Apply a thin layer to affected area twice daily as needed. 30 g 3     lovastatin (MEVACOR) 40 MG tablet  "Take 2 tablets (80 mg total) by mouth at bedtime. 180 tablet 0     omeprazole (PRILOSEC) 20 MG capsule Take 1 capsule (20 mg total) by mouth daily. 90 capsule 0     No current facility-administered medications for this visit.       Objective:   Vital Signs:   Visit Vitals     /58 (Patient Site: Right Arm, Patient Position: Sitting, Cuff Size: Adult Regular)     Pulse 67     Temp 98  F (36.7  C) (Oral)     Resp 20     Ht 5' 2.25\" (1.581 m)     Wt 147 lb 14.4 oz (67.1 kg)     SpO2 97%     BMI 26.83 kg/m2        VisionScreening:  No exam data present     PHYSICAL EXAM  Physical Examination: General appearance - alert, well appearing, and in no distress, oriented to person, place, and time and normal appearing weight  Mental status - alert, oriented to person, place, and time, normal mood, behavior, speech, dress, motor activity, and thought processes  Eyes - pupils equal and reactive, extraocular eye movements intact  Ears - bilateral TM's and external ear canals normal  Nose - normal and patent, no erythema, discharge or polyps  Mouth - mucous membranes moist, pharynx normal without lesions  Neck - supple, no significant adenopathy  Lymphatics - no palpable lymphadenopathy, no hepatosplenomegaly  Chest - clear to auscultation, no wheezes, rales or rhonchi, symmetric air entry  Heart - normal rate, regular rhythm, normal S1, S2, no murmurs, rubs, clicks or gallops  Abdomen - soft, nontender, nondistended, no masses or organomegaly  Breasts - breasts appear normal, no suspicious masses, no skin or nipple changes or axillary nodes  Neurological - alert, oriented, normal speech, no focal findings or movement disorder noted  Musculoskeletal - no joint tenderness, deformity or swelling  Extremities - peripheral pulses normal, no pedal edema, no clubbing or cyanosis  Skin - normal coloration and turgor, no rashes, no suspicious skin lesions noted       Assessment Results 10/31/2018   Activities of Daily Living No help " needed   Instrumental Activities of Daily Living No help needed   Some recent data might be hidden     A Mini-Cog score of 0-2 suggests the possibility of dementia, score of 3-5 suggests no dementia    Identified Health Risks:     She is at risk for lack of exercise and has been provided with information to increase physical activity for the benefit of her well-being.  Patient's advanced directive was discussed and I am comfortable with the patient's wishes.

## 2021-06-21 NOTE — LETTER
"Letter by Rosana Yepez MD at      Author: Rosana Yepez MD Service: -- Author Type: --    Filed:  Encounter Date: 12/30/2020 Status: (Other)         An Haas  [address] [facility] [address]             December 30, 2020        Dear Ms. Haas,    Below are the results from your recent visit:    Resulted Orders   Vitamin D, Total (25-Hydroxy)   Result Value Ref Range    Vitamin D, Total (25-Hydroxy) 43.6 30.0 - 80.0 ng/mL    Narrative    Deficiency <10.0 ng/mL  Insufficiency 10.0-29.9 ng/mL  Sufficiency 30.0-80.0 ng/mL  Toxicity (possible) >100.0 ng/mL   Vitamin B12   Result Value Ref Range    Vitamin B-12 390 213 - 816 pg/mL   Comprehensive Metabolic Panel   Result Value Ref Range    Sodium 145 136 - 145 mmol/L    Potassium 4.2 3.5 - 5.0 mmol/L    Chloride 108 (H) 98 - 107 mmol/L    CO2 27 22 - 31 mmol/L    Anion Gap, Calculation 10 5 - 18 mmol/L    Glucose 98 70 - 125 mg/dL    BUN 13 8 - 28 mg/dL    Creatinine 0.79 0.60 - 1.10 mg/dL    GFR MDRD Af Amer >60 >60 mL/min/1.73m2    GFR MDRD Non Af Amer >60 >60 mL/min/1.73m2    Bilirubin, Total 0.4 0.0 - 1.0 mg/dL    Calcium 9.4 8.5 - 10.5 mg/dL    Protein, Total 6.8 6.0 - 8.0 g/dL    Albumin 4.0 3.5 - 5.0 g/dL    Alkaline Phosphatase 95 45 - 120 U/L    AST 24 0 - 40 U/L    ALT 16 0 - 45 U/L    Narrative    Fasting Glucose reference range is 70-99 mg/dL per  American Diabetes Association (ADA) guidelines.   Lipid Jeff FASTING   Result Value Ref Range    Cholesterol 147 <=199 mg/dL    Triglycerides 94 <=149 mg/dL    HDL Cholesterol 45 (L) >=50 mg/dL    LDL Calculated 83 <=129 mg/dL    Patient Fasting > 8hrs? Yes        Normal vitamin D and vitamin B12 levels.  Normal kidney and liver function.  Your fasting blood sugar is in the normal range.  Your LDL or \"bad cholesterol\" looks good.  Your HDL or \"good cholesterol\" is a little bit low.  Regular exercise, healthy eating, and increased intake of plant-based fats (olive oil, avocados) or an " omega-3 fatty acid supplement can improve your HDL cholesterol.       Please call with questions or contact us using [employer].    Sincerely,        Electronically signed by Rosana Yepez MD

## 2021-06-24 NOTE — TELEPHONE ENCOUNTER
Refill Approved    Rx renewed per Medication Renewal Policy. Medication was last renewed on 8/12/2018.    Last OV: 10/31/2018.    Sherwin Choudhury, [facility] Triage/Med Refill 2/17/2019     Requested Prescriptions   Pending Prescriptions Disp Refills     omeprazole (PRILOSEC) 20 MG capsule [Pharmacy Med Name: Omeprazole Oral Capsule Delayed Release 20 MG] 90 capsule 0     Sig: Take 1 capsule (20 mg total) by mouth daily.    GI Medications Refill Protocol Passed - 2/14/2019  1:45 PM       Passed - PCP or prescribing provider visit in last 12 or next 3 months.    Last office visit with prescriber/PCP: 4/18/2016 Rosana Yepez MD OR same dept: Visit date not found OR same specialty: 4/18/2016 Rosana Yepez MD  Last physical: 10/31/2018 Last MTM visit: Visit date not found   Next visit within 3 mo: Visit date not found  Next physical within 3 mo: Visit date not found  Prescriber OR PCP: Rosana Yepez MD  Last diagnosis associated with med order: 1. Esophageal reflux  - omeprazole (PRILOSEC) 20 MG capsule [Pharmacy Med Name: Omeprazole Oral Capsule Delayed Release 20 MG]; Take 1 capsule (20 mg total) by mouth daily.  Dispense: 90 capsule; Refill: 0    If protocol passes may refill for 12 months if within 3 months of last provider visit (or a total of 15 months).

## 2021-08-25 NOTE — PATIENT INSTRUCTIONS
I recommend taking acetaminophen up to 1000 mg up to 3 times daily as needed for knee pain.  Consider taking a dose every morning to see if this makes a difference in your knee pain.  If this is inadequate, you could add ibuprofen as needed.  If your knee pain is not improving, the neck step would be to see an orthopedic specialist.  Please also consider a trial of a knee sleeve for additional support, which sometimes can help to relieve pain.

## 2021-08-29 NOTE — PROGRESS NOTES
Assessment/Plan:     Chronic pain of both knees  Symptoms most consistent with primary osteoarthritis.  Reviewed symptomatic cares including acetaminophen, use of knee sleeve as needed, ibuprofen as needed if inadequate control with above measures.  Could consider seeing orthopedics if not improving.    Tinea pedis of right foot  I suspect blister healing has been complicated by secondary fungal infection.  Prescription provided for clotrimazole.  - clotrimazole (LOTRIMIN) 1 % external cream; Apply topically 2 times daily for 14 days      Patient Instructions   I recommend taking acetaminophen up to 1000 mg up to 3 times daily as needed for knee pain.  Consider taking a dose every morning to see if this makes a difference in your knee pain.  If this is inadequate, you could add ibuprofen as needed.  If your knee pain is not improving, the neck step would be to see an orthopedic specialist.  Please also consider a trial of a knee sleeve for additional support, which sometimes can help to relieve pain.       Return in about 5 months (around 1/25/2022) for [facility] Visit.      Subjective:      An Haas is a 86 year old female presented clinic today to discuss bilateral knee pain that is gradually getting worse especially when arising from chair or getting out of the car.  Notes that she is needing to adjust how she is going up and down stairs as well.  Left knee hurts a bit worse than right.  Describes pain as being primarily just lateral to the patella and behind the patella.  Denies any instability or swelling.  No known injury.  Has not tried any intervention thus far.  Exercises regularly by walking at the [facility].    Notes that she developed a blister on the sole of her right foot several months ago, perhaps December, and it has never gone away entirely.  It does not cause her any symptoms, but visually remains.  She has not tried any interventions.    Current Outpatient Medications   Medication Sig  Dispense Refill     aspirin 81 MG EC tablet [ASPIRIN 81 MG EC TABLET] Take 81 mg by mouth daily.       CALCIUM CARBONATE/VITAMIN D3 (CALCIUM 600 + D,3, ORAL) [CALCIUM CARBONATE/VITAMIN D3 (CALCIUM 600 + D,3, ORAL)] Take by mouth daily. Calcium 600mg Vit D3 20mcg       cholecalciferol, vitamin D3, (VITAMIN D3) 10 mcg (400 unit) cap [CHOLECALCIFEROL, VITAMIN D3, (VITAMIN D3) 10 MCG (400 UNIT) CAP]        clobetasol (TEMOVATE) 0.05 % ointment [CLOBETASOL (TEMOVATE) 0.05 % OINTMENT] Apply to affected area twice daily as needed. 30 g 3     clotrimazole (LOTRIMIN) 1 % external cream Apply topically 2 times daily for 14 days 30 g 0     lovastatin (MEVACOR) 40 MG tablet [LOVASTATIN (MEVACOR) 40 MG TABLET] TAKE TWO TABLETS BY MOUTH DAILY AT BEDTIME 180 tablet 2     omeprazole (PRILOSEC) 20 MG capsule [OMEPRAZOLE (PRILOSEC) 20 MG CAPSULE] Take 1 capsule (20 mg total) by mouth daily before breakfast. 90 capsule 3     tolterodine (DETROL LA) 4 MG ER capsule [TOLTERODINE (DETROL LA) 4 MG ER CAPSULE] Take 1 capsule (4 mg total) by mouth daily. 90 capsule 3       Past Medical History, Family History, and Social History reviewed.  No past medical history on file.  Past Surgical History:   Procedure Laterality Date     BIOPSY BREAST       C APPENDECTOMY      Description: Appendectomy;  Recorded: 05/12/2008;     C LAP,VAG HYST,UTERUS 250GMS/<,SALP-OOPH      Description: Laparoscopy With Vaginal Hysterectomy;  Recorded: 06/02/2009;     HYSTERECTOMY       OOPHORECTOMY      ?unknown with hysterectomy?     NC LAP,SLING OPERATION      Description: Laparoscopic Sling Operation For Stress Incontinence;  Recorded: 05/12/2008;     Patient has no known allergies.  Family History   Problem Relation Age of Onset     Endometrial Cancer Mother      Cancer Mother      Social History     Socioeconomic History     Marital status:      Spouse name: Not on file     Number of children: Not on file     Years of education: Not on file     Highest  education level: Not on file   Occupational History     Not on file   Tobacco Use     Smoking status: Never Smoker     Smokeless tobacco: Never Used   Substance and Sexual Activity     Alcohol use: Not on file     Drug use: Not on file     Sexual activity: Not on file   Other Topics Concern     Not on file   Social History Narrative     Not on file     Social Determinants of Health     Financial Resource Strain:      Difficulty of Paying Living Expenses:    Food Insecurity:      Worried About Running Out of Food in the Last Year:      Ran Out of Food in the Last Year:    Transportation Needs:      Lack of Transportation (Medical):      Lack of Transportation (Non-Medical):    Physical Activity:      Days of Exercise per Week:      Minutes of Exercise per Session:    Stress:      Feeling of Stress :    Social Connections:      Frequency of Communication with Friends and Family:      Frequency of Social Gatherings with Friends and Family:      Attends [employer] Services:      Active Member of Clubs or Organizations:      Attends Club or Organization Meetings:      Marital Status:    Intimate Partner Violence:      Fear of Current or Ex-Partner:      Emotionally Abused:      Physically Abused:      Sexually Abused:          Review of systems is as stated in HPI, and the remainder of the 10 system review is otherwise negative.    Objective:     Vitals:    08/25/21 1604   BP: 130/86   BP Location: Right arm   Patient Position: Sitting   Cuff Size: Adult Regular   Pulse: 88   Weight: 67.7 kg (149 lb 3.2 oz)    Body mass index is 26.43 kg/m .    Alert female.  She needs to push up off the chair to arise.  She has difficulty in stepping up to sit on the exam table but is able to do so without assistance.  No edema or deformity of the knees bilaterally.  She is got some crepitus with extension and flexion bilaterally.  MCL, LCL, and ACL testing are intact.  No midline joint tenderness.  Sole of the left foot reveals a coin  shaped lesion, about the size of a quarter, with an erythematous base and mild flaking.  There is an overlying intact skin layer, though abnormal in appearance.      This note has been dictated using voice recognition software. Any grammatical or context distortions are unintentional and inherent to the the software.

## 2021-11-08 NOTE — PROGRESS NOTES
"SUBJECTIVE:   An Haas is a 87 year old female who presents for Preventive Visit.      Patient has been advised of split billing requirements and indicates understanding: Yes   Are you in the first 12 months of your Medicare coverage?  No    She was recently prescribed topical lovastatin and management of porokeratosis lesion on the sole of her foot.  She was told to try for 3 months, would like my opinion.  She also notes recent difficulties with increased bulging external hemorrhoids, occasionally with bleeding.  Triggered by chronic irregularity.  She is recently added Metamucil.  Bladder overall is doing okay, stable with having urgency going about every 2 hours.  She is not certain that tolterodine is really helpful.  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  She remains on oral lovastatin management of dyslipidemia.  Taking omeprazole just as needed for GERD, but overall seems better.  She is on and alendronate holiday, taking vitamin D regularly, due for follow-up bone density scan.  She has had some reduced hearing but overall feels she is managing okay.  We spent some time in discussing measures to reduce risk of falls.  Denies intake of alcohol, answer the question incorrectly today she states.    Healthy Habits:     In general, how would you rate your overall health?  Good    Frequency of exercise:  2-3 days/week    Duration of exercise:  Less than 15 minutes    Do you usually eat at least 4 servings of fruit and vegetables a day, include whole grains    & fiber and avoid regularly eating high fat or \"junk\" foods?  Yes    Taking medications regularly:  Yes    Medication side effects:  None    Ability to successfully perform activities of daily living:  No assistance needed    Home Safety:  Throw rugs in the hallway and lack of grab bars in the bathroom    Hearing Impairment:  Difficulty following a conversation in a noisy restaurant or crowded " room and difficulty understanding soft or whispered speech    In the past 6 months, have you been bothered by leaking of urine? Yes    In general, how would you rate your overall mental or emotional health?  Good      PHQ-2 Total Score: 1    Additional concerns today:  Yes    Do you feel safe in your environment? Yes    Have you ever done Advance Care Planning? (For example, a Health Directive, POLST, or a discussion with a medical provider or your loved ones about your wishes): No, advance care planning information given to patient to review.  Advanced care planning was discussed at today's visit.       Fall risk  Fallen 2 or more times in the past year?: No  Any fall with injury in the past year?: No    Cognitive Screening   1) Repeat 3 items (Leader, Season, Table)    2) Clock draw: NORMAL  3) 3 item recall: Recalls 3 objects  Results: 3 items recalled: COGNITIVE IMPAIRMENT LESS LIKELY    Mini-CogTM Copyright S Luis. Licensed by the author for use in [facility]; reprinted with permission (someg@[facility]). All rights reserved.        Reviewed and updated as needed this visit by clinical staff  Tobacco  Allergies              Reviewed and updated as needed this visit by Provider               Social History     Tobacco Use     Smoking status: Never Smoker     Smokeless tobacco: Never Used   Substance Use Topics     Alcohol use: Not on file         Alcohol Use 11/8/2021   Prescreen: >3 drinks/day or >7 drinks/week? Yes   AUDIT SCORE  0   Question about answered in error, she does not drink more than 1-2 drinks of alcohol and very rarely if at all.      Current providers sharing in care for this patient include:   Patient Care Team:  Rosana Yepez MD as PCP - General (Family Practice)  Rosana Yepez MD as Assigned PCP    The following health maintenance items are reviewed in Epic and correct as of today:  Health Maintenance Due   Topic Date Due     ANNUAL REVIEW OF HM ORDERS  Never done  "      Pertinent mammograms are reviewed under the imaging tab.    Review of Systems  Constitutional, HEENT, cardiovascular, pulmonary, GI, , musculoskeletal, neuro, skin, endocrine and psych systems are negative, except as otherwise noted.    OBJECTIVE:   /84   Pulse 75   Temp 97.6  F (36.4  C) (Oral)   Resp 16   Wt 67 kg (147 lb 9.6 oz)   SpO2 98%   BMI 26.15 kg/m   Estimated body mass index is 26.15 kg/m  as calculated from the following:    Height as of 12/21/20: 1.6 m (5' 3\").    Weight as of this encounter: 67 kg (147 lb 9.6 oz).  Physical Exam  GENERAL: healthy, alert and no distress  EYES: Eyes grossly normal to inspection, PERRL and conjunctivae and sclerae normal  HENT: ear canals and TM's normal, nose and mouth without ulcers or lesions  NECK: no adenopathy, no asymmetry, masses, or scars and thyroid normal to palpation  RESP: lungs clear to auscultation - no rales, rhonchi or wheezes  BREAST: normal without masses, tenderness or nipple discharge and no palpable axillary masses or adenopathy  CV: regular rate and rhythm, normal S1 S2, no S3 or S4, 2/6 systolic murmur identified at right upper sternal border, no peripheral edema and peripheral pulses strong  ABDOMEN: soft, nontender, no hepatosplenomegaly, no masses and bowel sounds normal  MS: no gross musculoskeletal defects noted, no edema  SKIN: no suspicious lesions or rashes  NEURO: Normal strength and tone, mentation intact and speech normal  PSYCH: mentation appears normal, affect normal/bright    Diagnostic Test Results:  Labs reviewed in Epic    ASSESSMENT / PLAN:     Medicare annual wellness visit, subsequent  At today's visit, we discussed lifestyle interventions to promote self-management and wellness, including maintenance of a healthy weight, healthy diet, regular physical activity and exercise, and falls prevention.  Immunizations reviewed and up-to-date.  Order placed for follow-up bone density scan.  Encouraged to have " "removal of throw rugs or fastening down of them if necessary, installation of grab bars.  Encourage consideration of hearing assessment if problematic, bothersome, or asymmetric.    Pure hypercholesterolemia  Encouraged efforts at healthy lifestyle habits.  Continue lovastatin.  - Lipid panel reflex to direct LDL Fasting; Future  - Comprehensive metabolic panel; Future  - Lipid panel reflex to direct LDL Fasting  - Comprehensive metabolic panel    Age-related osteoporosis without current pathological fracture  Encourage measures to reduce risk of falls, adequate calcium and vitamin D intake, and weightbearing exercise.  Notify with changes in balance or risk of falls.  She remains on alendronate holiday.  Check vitamin D level today to ensure deficiency.  Order placed for follow-up bone density scan.  - Vitamin D Deficiency; Future  - DX Hip/Pelvis/Spine; Future  - Vitamin D Deficiency    Urge Incontinence Of Urine  Seen tolterodine has been terribly active for her baby contributing to her hemorrhoids and chronic, patient.  Like to stop tolterodine.  Notify me with significant worsening of letter function with this discontinuation.    Lichen Sclerosus Et Atrophicus  Continue clobetasol.    Systolic ejection murmur  This is a new finding.  Will obtain echocardiogram to further characterize to determine if significant worsening.  - Echocardiogram Complete; Future     Patient has been advised of split billing requirements and indicates understanding: Yes  COUNSELING:  Reviewed preventive health counseling, as reflected in patient instructions  Special attention given to:       Regular exercise       Healthy diet/nutrition       Vision screening       Hearing screening       Dental care       Bladder control       Fall risk prevention       Osteoporosis prevention/bone health       Advanced Planning     Estimated body mass index is 26.15 kg/m  as calculated from the following:    Height as of 12/21/20: 1.6 m (5' 3\").    " Weight as of this encounter: 67 kg (147 lb 9.6 oz).    Weight management plan: Discussed healthy diet and exercise guidelines    She reports that she has never smoked. She has never used smokeless tobacco.      Appropriate preventive services were discussed with this patient, including applicable screening as appropriate for cardiovascular disease, diabetes, osteopenia/osteoporosis, and glaucoma.  As appropriate for age/gender, discussed screening for colorectal cancer, prostate cancer, breast cancer, and cervical cancer. Checklist reviewing preventive services available has been given to the patient.    Reviewed patients plan of care and provided an AVS. The Basic Care Plan (routine screening as documented in Health Maintenance) for An meets the Care Plan requirement. This Care Plan has been established and reviewed with the Patient.    Counseling Resources:  ATP IV Guidelines  Pooled Cohorts Equation Calculator  Breast Cancer Risk Calculator  Breast Cancer: Medication to Reduce Risk  FRAX Risk Assessment  ICSI Preventive Guidelines  Dietary Guidelines for Americans, 2010  USDA's MyPlate  ASA Prophylaxis  Lung CA Screening    Rosana Yepez MD  [facility]    Identified Health Risks:      The patient was provided with written information regarding signs of hearing loss.  Information on urinary incontinence and treatment options given to patient.

## 2021-11-08 NOTE — PATIENT INSTRUCTIONS
Patient Education      Work on increasing your intake of fluids and fiber.  Continue your fiber supplement.    Try stopping your tolterodine.  This should help with your bowel movements.  If your bladder symptoms suddenly get significantly worse, you may restart it.    Stop aspirin as this is no longer indicated.    Our cardiology office will contact you to schedule an ultrasound of your heart, called an echo, to assess the new murmur.    Personalized Prevention Plan  You are due for the preventive services outlined below.  Your care team is available to assist you in scheduling these services.  If you have already completed any of these items, please share that information with your care team to update in your medical record.  Health Maintenance Due   Topic Date Due     ANNUAL REVIEW OF HM ORDERS  Never done       Signs of Hearing Loss      Hearing much better with one ear can be a sign of hearing loss.   Hearing loss is a problem shared by many people. In fact, it is one of the most common health problems, particularly as people age. Most people age 65 and older have some hearing loss. By age 80, almost everyone does. Hearing loss often occurs slowly over the years. So you may not realize your hearing has gotten worse.  Have your hearing checked  Call your healthcare provider if you:    Have to strain to hear normal conversation    Have to watch other people s faces very carefully to follow what they re saying    Need to ask people to repeat what they ve said    Often misunderstand what people are saying    Turn the volume of the television or radio up so high that others complain    Feel that people are mumbling when they re talking to you    Find that the effort to hear leaves you feeling tired and irritated    Notice, when using the phone, that you hear better with one ear than the other  "[employer] last reviewed this educational content on 1/1/2020 2000-2021 [employer]. All rights reserved.  This information is not intended as a substitute for professional medical care. Always follow your healthcare professional's instructions.          Urinary Incontinence, Female (Adult)   Urinary incontinence means loss of bladder control. This problem affects many women, especially as they get older. If you have incontinence, you may be embarrassed to ask for help. But know that this problem can be treated.   Types of Incontinence  There are different types of incontinence. Two of the main types are described here. You can have more than one type.     Stress incontinence. With this type, urine leaks when pressure (stress) is put on the bladder. This may happen when you cough, sneeze, or laugh. Stress incontinence most often occurs because the pelvic floor muscles that support the bladder and urethra are weak. This can happen after pregnancy and vaginal childbirth or a hysterectomy. It can also be due to excess body weight or hormone changes.    Urge incontinence (also called overactive bladder). With this type, a sudden urge to urinate is felt often. This may happen even though there may not be much urine in the bladder. The need to urinate often during the night is common. Urge incontinence most often occurs because of bladder spasms. This may be due to bladder irritation or infection. Damage to bladder nerves or pelvic muscles, constipation, and certain medicines can also lead to urge incontinence.  Treatment depends on the cause. Further evaluation is needed to find the type you have. This will likely include an exam and certain tests. Based on the results, you and your healthcare provider can then plan treatment. Until a diagnosis is made, the home care tips below can help ease symptoms.   Home care    Do pelvic floor muscle exercises, if they are prescribed. The pelvic floor muscles help support the bladder and urethra. Many women find that their symptoms improve when doing special exercises that strengthen  these muscles. To do the exercises, contract the muscles you would use to stop your stream of urine. But do this when you re not urinating. Hold for 10 seconds, then relax. Repeat 10 to 20 times in a row, at least 3 times a day. Your healthcare provider may give you other instructions for how to do the exercises and how often.    Keep a bladder diary. This helps track how often and how much you urinate over a set period of time. Bring this diary with you to your next visit with the provider. The information can help your provider learn more about your bladder problem.    Lose weight, if advised to by your provider. Extra weight puts pressure on the bladder. Your provider can help you create a weight-loss plan that s right for you. This may include exercising more and making certain diet changes.    Don't have foods and drinks that may irritate the bladder. These can include alcohol and caffeinated drinks.    Quit smoking. Smoking and other tobacco use can lead to a long-term (chronic) cough that strains the pelvic floor muscles. Smoking may also damage the bladder and urethra. Talk with your provider about treatments or methods you can use to quit smoking.    If drinking large amounts of fluid makes you have symptoms, you may be advised to limit your fluid intake. You may also be advised to drink most of your fluids during the day and to limit fluids at night.    If you re worried about urine leakage or accidents, you may wear absorbent pads to catch urine. Change the pads often. This helps reduce discomfort. It may also reduce the risk of skin or bladder infections.    Follow-up care  Follow up with your healthcare provider, or as directed. It may take some to find the right treatment for your problem. But healthy lifestyle changes can be made right away. These include such things as exercising on a regular basis, eating a healthy diet, losing weight (if needed), and quitting smoking. Your treatment plan may  include special therapies or medicines. Certain procedures or surgery may also be options. Talk about any questions you have with your provider.   When to seek medical advice  Call the healthcare provider right away if any of these occur:    Fever of 100.4 F (38 C) or higher, or as directed by your provider    Bladder pain or fullness    Belly swelling    Nausea or vomiting    Back pain    Weakness, dizziness, or fainting  [employer] last reviewed this educational content on 1/1/2020 2000-2021 [employer]. All rights reserved. This information is not intended as a substitute for professional medical care. Always follow your healthcare professional's instructions.

## 2021-11-18 NOTE — TELEPHONE ENCOUNTER
Reason for Call:  Mail lab results    Detailed comments: Recvd call from josé miguel/An, she would like her lab results from 11.08.2021 mailed out to her as she is having a hard time getting on her computer to print them off herself.    I have confirmed her address in our system and it is current.    Phone Number Patient can be reached at: Home number on file [phone] (home)    Best Time: anytime    Can we leave a detailed message on this number? Not Applicable    Call taken on 11/18/2021 at 9:49 AM by Penny Tracy

## 2021-12-07 NOTE — TELEPHONE ENCOUNTER
"Last Written Prescription Date:  3/7/21  Last Fill Quantity: 180,  # refills: 2   Last office visit provider:  11/8/21     Requested Prescriptions   Pending Prescriptions Disp Refills     lovastatin (MEVACOR) 40 MG tablet [Pharmacy Med Name: Lovastatin Oral Tablet 40 MG] 180 tablet 0     Sig: TAKE TWO TABLETS BY MOUTH DAILY AT BEDTIME       Statins Protocol Passed - 12/5/2021  2:00 AM        Passed - LDL on file in past 12 months     Recent Labs   Lab Test 11/08/21  1158   LDL 75             Passed - No abnormal creatine kinase in past 12 months     No lab results found.             Passed - Recent (12 mo) or future (30 days) visit within the authorizing provider's specialty     Patient has had an office visit with the authorizing provider or a provider within the authorizing providers department within the previous 12 mos or has a future within next 30 days. See \"Patient Info\" tab in inbasket, or \"Choose Columns\" in Meds & Orders section of the refill encounter.              Passed - Medication is active on med list        Passed - Patient is age 18 or older        Passed - No active pregnancy on record        Passed - No positive pregnancy test in past 12 months             Talon Jones RN 12/07/21 7:34 AM  "

## 2022-01-01 ENCOUNTER — APPOINTMENT (OUTPATIENT)
Dept: CT IMAGING | Facility: CLINIC | Age: 87
End: 2022-01-01
Payer: COMMERCIAL

## 2022-01-01 ENCOUNTER — HOSPITAL ENCOUNTER (EMERGENCY)
Facility: CLINIC | Age: 87
End: 2022-01-22
Attending: EMERGENCY MEDICINE | Admitting: EMERGENCY MEDICINE
Payer: COMMERCIAL

## 2022-01-01 VITALS
DIASTOLIC BLOOD PRESSURE: 82 MMHG | OXYGEN SATURATION: 99 % | HEART RATE: 134 BPM | BODY MASS INDEX: 26.04 KG/M2 | TEMPERATURE: 97.1 F | WEIGHT: 147 LBS | RESPIRATION RATE: 38 BRPM | SYSTOLIC BLOOD PRESSURE: 119 MMHG

## 2022-01-01 DIAGNOSIS — R07.9 CHEST PAIN, UNSPECIFIED TYPE: ICD-10-CM

## 2022-01-01 DIAGNOSIS — I71.019 DISSECTION OF THORACIC AORTA (H): Primary | ICD-10-CM

## 2022-01-01 LAB
ANION GAP SERPL CALCULATED.3IONS-SCNC: 13 MMOL/L (ref 5–18)
ATRIAL RATE - MUSE: 141 BPM
BASOPHILS # BLD AUTO: 0 10E3/UL (ref 0–0.2)
BASOPHILS NFR BLD AUTO: 0 %
BUN SERPL-MCNC: 14 MG/DL (ref 8–28)
CALCIUM SERPL-MCNC: 9.6 MG/DL (ref 8.5–10.5)
CHLORIDE BLD-SCNC: 106 MMOL/L (ref 98–107)
CO2 SERPL-SCNC: 24 MMOL/L (ref 22–31)
CREAT SERPL-MCNC: 0.81 MG/DL (ref 0.6–1.1)
DIASTOLIC BLOOD PRESSURE - MUSE: 59 MMHG
EOSINOPHIL # BLD AUTO: 0.2 10E3/UL (ref 0–0.7)
EOSINOPHIL NFR BLD AUTO: 2 %
ERYTHROCYTE [DISTWIDTH] IN BLOOD BY AUTOMATED COUNT: 12.3 % (ref 10–15)
GFR SERPL CREATININE-BSD FRML MDRD: 70 ML/MIN/1.73M2
GLUCOSE BLD-MCNC: 112 MG/DL (ref 70–125)
HCT VFR BLD AUTO: 44.7 % (ref 35–47)
HGB BLD-MCNC: 14.3 G/DL (ref 11.7–15.7)
IMM GRANULOCYTES # BLD: 0 10E3/UL
IMM GRANULOCYTES NFR BLD: 0 %
INTERPRETATION ECG - MUSE: NORMAL
LYMPHOCYTES # BLD AUTO: 2.8 10E3/UL (ref 0.8–5.3)
LYMPHOCYTES NFR BLD AUTO: 31 %
MCH RBC QN AUTO: 30 PG (ref 26.5–33)
MCHC RBC AUTO-ENTMCNC: 32 G/DL (ref 31.5–36.5)
MCV RBC AUTO: 94 FL (ref 78–100)
MONOCYTES # BLD AUTO: 0.8 10E3/UL (ref 0–1.3)
MONOCYTES NFR BLD AUTO: 8 %
NEUTROPHILS # BLD AUTO: 5.3 10E3/UL (ref 1.6–8.3)
NEUTROPHILS NFR BLD AUTO: 59 %
NRBC # BLD AUTO: 0 10E3/UL
NRBC BLD AUTO-RTO: 0 /100
P AXIS - MUSE: NORMAL DEGREES
PLATELET # BLD AUTO: 242 10E3/UL (ref 150–450)
POTASSIUM BLD-SCNC: 3.6 MMOL/L (ref 3.5–5)
PR INTERVAL - MUSE: NORMAL MS
QRS DURATION - MUSE: 68 MS
QT - MUSE: 284 MS
QTC - MUSE: 456 MS
R AXIS - MUSE: -75 DEGREES
RADIOLOGIST FLAGS: ABNORMAL
RBC # BLD AUTO: 4.77 10E6/UL (ref 3.8–5.2)
SODIUM SERPL-SCNC: 143 MMOL/L (ref 136–145)
SYSTOLIC BLOOD PRESSURE - MUSE: 109 MMHG
T AXIS - MUSE: 55 DEGREES
TROPONIN I SERPL-MCNC: <0.01 NG/ML (ref 0–0.29)
VENTRICULAR RATE- MUSE: 155 BPM
WBC # BLD AUTO: 9 10E3/UL (ref 4–11)

## 2022-01-01 PROCEDURE — 250N000011 HC RX IP 250 OP 636: Performed by: EMERGENCY MEDICINE

## 2022-01-01 PROCEDURE — 74174 CTA ABD&PLVS W/CONTRAST: CPT

## 2022-01-01 PROCEDURE — 92950 HEART/LUNG RESUSCITATION CPR: CPT

## 2022-01-01 PROCEDURE — 70498 CT ANGIOGRAPHY NECK: CPT

## 2022-01-01 PROCEDURE — 36415 COLL VENOUS BLD VENIPUNCTURE: CPT | Performed by: EMERGENCY MEDICINE

## 2022-01-01 PROCEDURE — 71275 CT ANGIOGRAPHY CHEST: CPT

## 2022-01-01 PROCEDURE — 31500 INSERT EMERGENCY AIRWAY: CPT

## 2022-01-01 PROCEDURE — 80048 BASIC METABOLIC PNL TOTAL CA: CPT | Performed by: EMERGENCY MEDICINE

## 2022-01-01 PROCEDURE — 85025 COMPLETE CBC W/AUTO DIFF WBC: CPT | Performed by: EMERGENCY MEDICINE

## 2022-01-01 PROCEDURE — 96374 THER/PROPH/DIAG INJ IV PUSH: CPT | Mod: 59

## 2022-01-01 PROCEDURE — 93005 ELECTROCARDIOGRAM TRACING: CPT | Performed by: EMERGENCY MEDICINE

## 2022-01-01 PROCEDURE — 99291 CRITICAL CARE FIRST HOUR: CPT | Mod: 25

## 2022-01-01 PROCEDURE — 250N000013 HC RX MED GY IP 250 OP 250 PS 637: Performed by: EMERGENCY MEDICINE

## 2022-01-01 PROCEDURE — 99292 CRITICAL CARE ADDL 30 MIN: CPT

## 2022-01-01 PROCEDURE — 84484 ASSAY OF TROPONIN QUANT: CPT | Performed by: EMERGENCY MEDICINE

## 2022-01-01 PROCEDURE — 999N000157 HC STATISTIC RCP TIME EA 10 MIN

## 2022-01-01 RX ORDER — EPINEPHRINE IN SOD CHLOR,ISO 1 MG/10 ML
SYRINGE (ML) INTRAVENOUS
Status: DISCONTINUED
Start: 2022-01-01 | End: 2022-01-01 | Stop reason: HOSPADM

## 2022-01-01 RX ORDER — ACETAMINOPHEN 325 MG/1
650 TABLET ORAL ONCE
Status: COMPLETED | OUTPATIENT
Start: 2022-01-01 | End: 2022-01-01

## 2022-01-01 RX ORDER — HYDROMORPHONE HCL IN WATER/PF 6 MG/30 ML
0.2 PATIENT CONTROLLED ANALGESIA SYRINGE INTRAVENOUS ONCE
Status: COMPLETED | OUTPATIENT
Start: 2022-01-01 | End: 2022-01-01

## 2022-01-01 RX ORDER — IOPAMIDOL 755 MG/ML
100 INJECTION, SOLUTION INTRAVASCULAR ONCE
Status: COMPLETED | OUTPATIENT
Start: 2022-01-01 | End: 2022-01-01

## 2022-01-01 RX ADMIN — IOPAMIDOL 100 ML: 755 INJECTION, SOLUTION INTRAVENOUS at 13:19

## 2022-01-01 RX ADMIN — HYDROMORPHONE HYDROCHLORIDE 0.2 MG: 0.2 INJECTION, SOLUTION INTRAMUSCULAR; INTRAVENOUS; SUBCUTANEOUS at 12:06

## 2022-01-01 RX ADMIN — ACETAMINOPHEN 650 MG: 325 TABLET ORAL at 12:48

## 2022-01-01 ASSESSMENT — ENCOUNTER SYMPTOMS
HEADACHES: 0
WEAKNESS: 0
DIZZINESS: 0
NECK PAIN: 1
SPEECH DIFFICULTY: 0
NECK STIFFNESS: 0
FACIAL ASYMMETRY: 0
DIARRHEA: 0
CHILLS: 0
NUMBNESS: 0
VOMITING: 0
SHORTNESS OF BREATH: 0
NAUSEA: 0
ABDOMINAL PAIN: 0
FEVER: 0
COUGH: 0
LIGHT-HEADEDNESS: 0

## 2022-01-22 NOTE — ED PROVIDER NOTES
Emergency Department Midlevel Supervisory Note     I personally saw the patient and performed a substantive portion of the visit including all aspects of the medical decision making.    ED Course:  11:49 AM Jacki Ramsey PA-C staffed patient with me. I agree with their assessment and plan of management, and I will see the patient.  12:08 PM I met with the patient to introduce myself, gather additional history, perform my initial exam, and discuss the plan.     Brief HPI:     An Haas is a 87 year old female with a history of osteoporosis, HLD, and GERD, who presents via private vehicle for evaluation of chest and neck pain.    Patient was rotating her mattress this morning when she had acute onset of constant, dull chest pain that radiates up into her neck and underneath her chin. She notes the pain is exacerbated and becomes sharp in nature with inspiration and movement. She has never had pain like this before. Patient took a low dose ASA given concern for a potential heart attack. Denies nausea, vomiting, abdominal pain, fevers, chills, headache, or numbness or paresthesias in her face or arms. She denies a cardiac history of history of PE or DVT.     I, Radha Tena, am serving as a scribe to document services personally performed by Cody Lechuga MD, based on my observations and the provider's statements to me.   I, Cody Lechuga MD, attest that Radha Tena was acting in a scribe capacity, has observed my performance of the services and has documented them in accordance with my direction.    Brief Physical Exam:  Constitutional:  Alert, in no acute distress  EYES: Conjunctivae clear  HENT:  Atraumatic, normocephalic  Respiratory:  Respirations even, unlabored, in no acute respiratory distress  Cardiovascular:  Regular rate and rhythm, good peripheral perfusion  GI: Soft, nondistended, nontender, no palpable masses, no rebound, no guarding   Musculoskeletal:  No edema. No cyanosis. Range of  motion major extremities intact.    Integument: Warm, Dry, No erythema, No rash.   Neurologic:  Alert & oriented, no focal deficits noted  Psych: Normal mood and affect     MDM:  Briefly, patient is an 87-year-old female who presents with neck and chest pain.  Patient appears nontoxic with stable vital signs, afebrile.  She has no tachycardia, hypoxia, no fever.  She appears comfortable at the bedside though states she has worsening of her chest discomfort every time she takes a deep breath or burps.  She denies specific ripping or tearing chest discomfort of the back or shoulders, hemoptysis or productive cough.  Considered ACS, PE, considered but overall lower suspicion for dissection given her apparent comfort at the bedside, reassuring vitals, considered possible esophageal injury or spasm, no fever or cough, no other systemic signs of illness to suggest CHF, COPD, asthma, COVID, pneumonia or other more malicious etiology of symptoms.  We will obtain screening labs, troponin, ECG and CT imaging of the neck and chest.  Patient was given pain medications here.    Reassessment: Shortly after returning from imaging studies I was called into the patient's room by nursing staff secondary to unresponsive condition. Initially bedside ultrasound showed cardiac motion but we are unable to palpate distal pulses, ECG demonstrated sinus rhythm, patient was in a PEA arrest. ACLS protocol initiated. We were able to initially obtain return of spontaneous circulation, patient was awake, interactive, following commands. Unfortunately shortly thereafter pulses were lost a second time, ACLS protocol was reinitiated. At this time advanced airway was placed for protection of airway. We did review the laboratory studies which showed no acute abnormalities, per tickly no electrolyte abnormalities, troponin was negative, ECG did not demonstrate any clear specific ST acute ischemic changes. However at this time we did obtain information  from radiology that patient CT demonstrated acute type a dissection of the ascending thoracic aorta, mild cardiac enlargement with 4 mm radial thickness hemopericardium. Bedside ultrasound did not demonstrate any large pericardial effusion, certainly not amenable to pericardiocentesis. ACLS protocol continued until family arrived at the department, myself and the physicians assistant spoke with the patient's son and daughter, son-in-law. After long conversation regarding risks and benefits of intervention, diagnosis, prognosis and likely outcomes at this time family was agreeable with termination of resuscitation. Patient  in the emergency department secondary to PEA arrest from type a ascending thoracic aortic dissection. Condolences were provided to the family.        1. Dissection of thoracic aorta (H)    2. Chest pain, unspecified type         Labs and Imaging:  Results for orders placed or performed during the hospital encounter of 22   CTA Neck with Contrast    Impression    IMPRESSION:   HEAD CT:  1.  No acute intracranial abnormalities identified.    HEAD CTA:   1.  No high-grade stenosis or intracranial branch occlusion.    NECK CTA:  1.  No measurable stenosis or evidence of dissection.   CTA Chest Abdomen Pelvis w Contrast   Result Value Ref Range    Radiologist flags Acute dissection ascending thoracic aorta (AA)     Impression    IMPRESSION:  1.  Acute type A dissection of the ascending thoracic aorta as detailed above.   2.  Mild mural thickening of the patent thoracic aortic arch, brachiocephalic arteries and proximal descending thoracic could indicate early intramural hemorrhage.  3.  Mild cardiac enlargement with 4 mm radial thickness hemopericardium.  4.  Fluid distention of the stomach.    [Critical Result: Acute dissection ascending thoracic aorta]    Finding was identified on 2022 2:26 PM.     Dr. Marks and Dr. Webster were contacted by me on 2022 2:30 PM and verbalized  understanding of the critical result.    Basic metabolic panel   Result Value Ref Range    Sodium 143 136 - 145 mmol/L    Potassium 3.6 3.5 - 5.0 mmol/L    Chloride 106 98 - 107 mmol/L    Carbon Dioxide (CO2) 24 22 - 31 mmol/L    Anion Gap 13 5 - 18 mmol/L    Urea Nitrogen 14 8 - 28 mg/dL    Creatinine 0.81 0.60 - 1.10 mg/dL    Calcium 9.6 8.5 - 10.5 mg/dL    Glucose 112 70 - 125 mg/dL    GFR Estimate 70 >60 mL/min/1.73m2   Result Value Ref Range    Troponin I <0.01 0.00 - 0.29 ng/mL   CBC with platelets and differential   Result Value Ref Range    WBC Count 9.0 4.0 - 11.0 10e3/uL    RBC Count 4.77 3.80 - 5.20 10e6/uL    Hemoglobin 14.3 11.7 - 15.7 g/dL    Hematocrit 44.7 35.0 - 47.0 %    MCV 94 78 - 100 fL    MCH 30.0 26.5 - 33.0 pg    MCHC 32.0 31.5 - 36.5 g/dL    RDW 12.3 10.0 - 15.0 %    Platelet Count 242 150 - 450 10e3/uL    % Neutrophils 59 %    % Lymphocytes 31 %    % Monocytes 8 %    % Eosinophils 2 %    % Basophils 0 %    % Immature Granulocytes 0 %    NRBCs per 100 WBC 0 <1 /100    Absolute Neutrophils 5.3 1.6 - 8.3 10e3/uL    Absolute Lymphocytes 2.8 0.8 - 5.3 10e3/uL    Absolute Monocytes 0.8 0.0 - 1.3 10e3/uL    Absolute Eosinophils 0.2 0.0 - 0.7 10e3/uL    Absolute Basophils 0.0 0.0 - 0.2 10e3/uL    Absolute Immature Granulocytes 0.0 <=0.4 10e3/uL    Absolute NRBCs 0.0 10e3/uL   ECG 12-LEAD WITH MUSE (LHE)   Result Value Ref Range    Systolic Blood Pressure 109 mmHg    Diastolic Blood Pressure 59 mmHg    Ventricular Rate 155 BPM    Atrial Rate 141 BPM    SD Interval  ms    QRS Duration 68 ms     ms    QTc 456 ms    P Axis  degrees    R AXIS -75 degrees    T Axis 55 degrees    Interpretation ECG       Atrial fibrillation with rapid ventricular response  Left axis deviation  Abnormal ECG  When compared with ECG of 22-JAN-2022 13:43,  Atrial fibrillation has replaced Sinus rhythm  Vent. rate has increased BY  85 BPM  QRS axis Shifted left  Criteria for Septal infarct are no longer  Present  Non-specific change in ST segment in Inferior leads  Confirmed by SEE ED PROVIDER NOTE FOR, ECG INTERPRETATION ([id number]),  KATERINE MONREAL ([id number]) on 1/22/2022 7:06:36 PM        I have reviewed the relevant laboratory and radiology studies    Procedures:  I was present for the key portions of this procedure: Endotracheal intubation, ACLS protocol    Cody Lechuga MD  [facility] EMERGENCY ROOM   [address] [place] [id number]  [phone]        Cody Lechuga MD  01/23/22 0628

## 2022-01-22 NOTE — ED TRIAGE NOTES
Patient is here with chest pain that radiates up to her neck with shortness of breath after rotating her mattress today.

## 2022-01-22 NOTE — ED NOTES
Primary RN responded to pt's call light  Per primary RN  Deandre,  pt was reported to have decreased loc and not responding to verbal or tactile stimuli   Pt had palpable pulse initially with shallow resp, sb 40's on monitor  Unable to obtain/measure sat and bp at this time  Provider was called to bs   Pt proceeded to lose pulse, breathing was agonal, code blue called   See code blue documentation

## 2022-01-22 NOTE — PROGRESS NOTES
Pt intubated with 7.0 cuffed ett @ 22cm @ lip around 1400. Pt was manually ventilated while CPR was being preformed. ETT placement confirmed with pedicap and 02 saturations increasing.     Ventilation stopped at 1503 per MD.     Angelina Overton, RT

## 2022-01-22 NOTE — ED PROVIDER NOTES
EMERGENCY DEPARTMENT ENCOUNTER      NAME: An Haas  AGE: 87 year old female  YOB: 1934  MRN: [id number]  EVALUATION DATE & TIME: 1/22/2022 11:34 AM    PCP: Rosana Yepez    ED PROVIDER: Jacki Ramsey PA-C      Chief Complaint   Patient presents with     Neck Pain     Shortness of Breath     Chest Pain         FINAL IMPRESSION:  1. Dissection of thoracic aorta (H)    2. Chest pain, unspecified type          ED COURSE & MEDICAL DECISION MAKING:    Pertinent Labs & Imaging studies reviewed. (See chart for details)    87 year old female presents to the Emergency Department for evaluation of chest pain radiating to the neck.    Physical exam is remarkable for a generally well-appearing elderly female who is in no acute distress.  Heart and lung sounds are clear diffusely throughout.  Patient has mild anterior chest wall tenderness and reports increased pain in the chest with deep breaths on lung exam.  She has full range of motion of the neck, no tenderness noted on midline or over the lymph nodes.  No focal neurologic deficits.  Vital signs are stable and she is afebrile.    CBC is unremarkable with no leukocytosis or anemia.  BMP is unremarkable with no significant electrolyte derangements, normal kidney function. Troponin is negative, EKG is nonischemic; T wave inversion noted in aVL that is nonspecific. CTA of the neck and chest/abdomen pelvis obtained to rule out dissections or PE. CTA of the head and neck is negative. CTA of the chest/abdomen/pelvis showing an acute type a dissection of the ascending thoracic aorta with associated hemopericardium.    Unfortunately, the patient rang her call bell and shortly afterwards became unresponsive.  She was breathing spontaneously but there was no palpable radial pulse.  She had cardiac activity on ultrasound initially however after a few minutes, this ceased.  CPR was immediately initiated and patient was resuscitated via ACLS protocol. ROSC  achieved after approximately 20 minutes of resuscitation but unfortunately coded again approximately 12 minutes later.  She was intubated successfully and resuscitation was continued per ACLS protocol.  Family arrived around 2:50 PM and after a short period of discussion regarding diagnosis, treatment plans, and likely outcomes, they determined to withdraw care. Patient  in ER with cause of death being a type A ascending thoracic aortic dissection.    Care was discussed with Dr. Cody Lechuga who directly supervised the code and intubation.    ED Course   11:36 AM Performed my initial history and physical exam. Discussed workup in the emergency department, management of symptoms, and likely disposition.   11:52 AM Staffed with Dr. Cody Lechuga  1:40 PM Patient rang call bell.   1:42 PM No palpable radial pulse, breathing spontaneously. Cardiac activity detected via bedside ultrasound. Oxygen support provided, pulse monitored.  1:56 PM PEA via cardiac monitor, CPR initiated. Fluids and 1.0 mg epinephrine administered.   1:59 PM No pulse on pulse check, PEA on cardiac monitor. Second 1.0 mg epinephrine administered.   2:02 PM ROSC achieved. Strong pulse in 130s, spontaneous breathing. Patient is responsive.  2:14 PM Code blue called  2:16 ROSC, agonal breathing  2:20 PM 30 mg etomidate given  2:21  mg rocuronium given  2:21 PM Intubated by myself with Dr. Lechuga's direct supervision. #7.0 24 cm at the teeth, good breath sounds with ventilation  2:23 PM No pulse, CPR initiated  2:25 PM 1.0 mg Epinephrine administered  2:25 PM 2 mg narcan administered  2:27 PM CaCL administered  2:27 PM Pulse check, PEA on the moinotr   2:28 PM 1.0 mg Epinephrine administered  2:31 PM Sodium bicarb administered  2:33 PM Dr. Webster took the call from radiology, imaging shows an dissected ascending aorta.  2:36 PM 1.0 mg Epinephrine administered  2:39 PM Pulse check, no pulse. CPR resumed.   2:40 PM Discussed patient's  status with son, daughter, and son-in-law. They will make a decision regarding treatment.  2:41 PM 1.0 mg Epinephrine given  2:45 PM Pulse check, no pulse. CPR resumed.   2:47 PM 1.0 mg Epinephrine given  2:50 PM Pulse check, no pulse. CPR resumed.   2:50 PM 1.0 mg Epinephrine given   2:51 PM Discussed patient's status with sonMinh and daughter,   2:53 PM Pulse check, no pulse. CPR resumed  2:56 PM 1.0 mg Epinephrine given.   2:59 PM Pulse check, no pulse. CPR resumed.  3:00 PM Family would like to withdraw care.  3:02 PM Time of death announced by Dr. Lechuga.    3:10 PM Family brought in to see the patient.   3:30 PM Discussed outcome with family, all additional questions answered.    Voice recognition software was used in the creation of this note. Any grammatical or nonsensical errors are due to inherent errors with the software and are not the intention of the writer.     Critical Care  Performed by: Jacki Diehl PA-C  Authorized by: Jacki Diehl PA-C    Total critical care time: 100 minutes  Critical care time was exclusive of separately billable procedures and treating other patients.  Critical care was necessary to treat or prevent imminent or life-threatening deterioration of the following conditions: Type A ascending aortic dissection with cardiac arrest  Critical care was time spent personally by me on the following activities: development of treatment plan with patient or surrogate, discussions with consultants, examination of patient, evaluation of patient's response to treatment, obtaining history from patient or surrogate, ordering and performing treatments and interventions, ordering and review of laboratory studies, ordering and review of radiographic studies and re-evaluation of patient's condition, this excludes any separately billable procedures.      MEDICATIONS GIVEN IN THE EMERGENCY:  Medications   EPINEPHrine (ADRENALIN) 1 MG/10ML injection (has no administration in time range)  "  HYDROmorphone (DILAUDID) injection 0.2 mg (0.2 mg Intravenous Given 1/22/22 1206)   acetaminophen (TYLENOL) tablet 650 mg (650 mg Oral Given 1/22/22 1248)   iopamidol (ISOVUE-370) solution 100 mL (100 mLs Intravenous Given 1/22/22 1319)       NEW PRESCRIPTIONS STARTED AT TODAY'S ER VISIT  New Prescriptions    No medications on file            =================================================================    HPI    Patient information was obtained from: Patient    Use of Intrepreter: N/A        An Haas is a 87 year old female with PMH of GERD, HLD, and osteoporosis who presents to the ED via walk-in for evaluation of shortness of breath and neck pain.     The patient reports that about 1.5 hours ago, she was rotating her mattress and had sudden onset of sharp chest pain that radiated up into her neck and under her chin.  The pain is constant and dull, it becomes sharp with breathing and motion.  She has never had pain like this before.  She also notes one brief episode of \"bubbly vision\" which has since resolved.  She took a low-dose aspirin at home as she was concerned she may be having a heart attack.  She denies any history of cardiac problems, no history of PE or DVT.  She currently denies any nausea, vomiting, headache, numbness or tingling in her face or arms, abdominal pain, recent illness, fevers, or chills.    REVIEW OF SYSTEMS   Review of Systems   Constitutional: Negative for chills and fever.   Eyes: Positive for visual disturbance (Now resolved).   Respiratory: Negative for cough and shortness of breath.    Cardiovascular: Positive for chest pain.   Gastrointestinal: Negative for abdominal pain, diarrhea, nausea and vomiting.   Musculoskeletal: Positive for neck pain. Negative for neck stiffness.   Neurological: Negative for dizziness, facial asymmetry, speech difficulty, weakness, light-headedness, numbness and headaches.       All other systems reviewed and are negative unless noted in " HPI.      PAST MEDICAL HISTORY:  No past medical history on file.    PAST SURGICAL HISTORY:  Past Surgical History:   Procedure Laterality Date     BIOPSY BREAST       HYSTERECTOMY       OOPHORECTOMY      ?unknown with hysterectomy?     CT LAP,SLING OPERATION      Description: Laparoscopic Sling Operation For Stress Incontinence;  Recorded: 05/12/2008;     [facility] APPENDECTOMY      Description: Appendectomy;  Recorded: 05/12/2008;     [facility] LAP,VAG HYST,UTERUS 250GMS/<,SALP-OOPH      Description: Laparoscopy With Vaginal Hysterectomy;  Recorded: 06/02/2009;       CURRENT MEDICATIONS:    CALCIUM CARBONATE/VITAMIN D3 (CALCIUM 600 + D,3, ORAL)  cholecalciferol, vitamin D3, (VITAMIN D3) 10 mcg (400 unit) cap  clobetasol (TEMOVATE) 0.05 % ointment  lovastatin (MEVACOR) 40 MG tablet  omeprazole (PRILOSEC) 20 MG capsule  tolterodine (DETROL LA) 4 MG ER capsule        ALLERGIES:  No Known Allergies    FAMILY HISTORY:  Family History   Problem Relation Age of Onset     Endometrial Cancer Mother      Cancer Mother        SOCIAL HISTORY:   Social History     Socioeconomic History     Marital status:      Spouse name: Not on file     Number of children: Not on file     Years of education: Not on file     Highest education level: Not on file   Occupational History     Not on file   Tobacco Use     Smoking status: Never Smoker     Smokeless tobacco: Never Used   Substance and Sexual Activity     Alcohol use: Not on file     Drug use: Not on file     Sexual activity: Not on file   Other Topics Concern     Not on file   Social History Narrative     Not on file     Social Determinants of Health     Financial Resource Strain: Low Risk      Difficulty of Paying Living Expenses: Not hard at all   Food Insecurity: No Food Insecurity     Worried About Running Out of Food in the Last Year: Never true     Ran Out of Food in the Last Year: Never true   Transportation Needs: No Transportation Needs     Lack of Transportation (Medical): No      Lack of Transportation (Non-Medical): No   Physical Activity: Insufficiently Active     Days of Exercise per Week: 2 days     Minutes of Exercise per Session: 30 min   Stress: Stress Concern Present     Feeling of Stress : To some extent   Social Connections: Moderately Isolated     Frequency of Communication with Friends and Family: More than three times a week     Frequency of Social Gatherings with Friends and Family: Three times a week     Attends Holiness Services: More than 4 times per year     Active Member of Clubs or Organizations: No     Attends Club or Organization Meetings: Not on file     Marital Status:    Intimate Partner Violence: Not on file   Housing Stability: Low Risk      Unable to Pay for Housing in the Last Year: No     Number of Places Lived in the Last Year: 1     Unstable Housing in the Last Year: No       VITALS:  Patient Vitals for the past 24 hrs:   BP Temp Pulse Resp SpO2 Weight   01/22/22 1400 119/82 -- (!) 134 (!) 38 -- --   01/22/22 1330 (!) 172/81 -- 54 29 99 % --   01/22/22 1238 (!) 146/77 -- 60 22 98 % --   01/22/22 1213 (!) 142/72 -- 64 16 99 % --   01/22/22 1109 135/81 97.1  F (36.2  C) 66 16 99 % 66.7 kg (147 lb)       PHYSICAL EXAM    VITAL SIGNS: /82   Pulse (!) 134   Temp 97.1  F (36.2  C)   Resp (!) 38   Wt 66.7 kg (147 lb)   SpO2 99%   BMI 26.04 kg/m    General Appearance: Alert, cooperative, normal speech and facial symmetry, appears stated age, the patient does not appear in distress  Head:  Normocephalic, without obvious abnormality, atraumatic  Eyes: Conjunctiva/corneas clear, EOM's intact, no nystagmus, PERRL  ENT:  Lips, mucosa, and tongue normal; teeth and gums normal, no pharyngeal inflammation, no dysphonia or difficulty swallowing, membranes are moist without pallor  Neck:  Supple, symmetrical, trachea midline, no adenopathy; thyroid is not enlarged and there are no palpable nodules or tenderness; no soft tissue swelling or  tenderness  Chest: Mild anterior chest wall tenderness to palpation  Cardio:  Regular rate and rhythm, S1 and S2 normal, no murmur, rub    or gallop, 2+ pulses symmetric in all extremities  Pulm:  Clear to auscultation bilaterally, respirations unlabored with no accessory muscle use  Back:  Symmetric, no curvature, ROM normal, no CVA tenderness, no spinal tenderness  Abdomen:  Abdomen is soft, non-distended with no tenderness to palpation, rebound tenderness, or guarding.   Extremities:  Extremities normal, there is no tenderness to palpation , atraumatic, no cyanosis or edema, full function and range of motion  Neuro: Patient is awake, alert, and responsive to voice. No gross motor weaknesses or sensory loss; moves all extremities.     LAB:  All pertinent labs reviewed and interpreted.  Labs Ordered and Resulted from Time of ED Arrival to Time of ED Departure   BASIC METABOLIC PANEL - Normal       Result Value    Sodium 143      Potassium 3.6      Chloride 106      Carbon Dioxide (CO2) 24      Anion Gap 13      Urea Nitrogen 14      Creatinine 0.81      Calcium 9.6      Glucose 112      GFR Estimate 70     TROPONIN I - Normal    Troponin I <0.01     CBC WITH PLATELETS AND DIFFERENTIAL    WBC Count 9.0      RBC Count 4.77      Hemoglobin 14.3      Hematocrit 44.7      MCV 94      MCH 30.0      MCHC 32.0      RDW 12.3      Platelet Count 242      % Neutrophils 59      % Lymphocytes 31      % Monocytes 8      % Eosinophils 2      % Basophils 0      % Immature Granulocytes 0      NRBCs per 100 WBC 0      Absolute Neutrophils 5.3      Absolute Lymphocytes 2.8      Absolute Monocytes 0.8      Absolute Eosinophils 0.2      Absolute Basophils 0.0      Absolute Immature Granulocytes 0.0      Absolute NRBCs 0.0         RADIOLOGY:  Reviewed all pertinent imaging. Please see official radiology report.  CTA Neck with Contrast   Final Result   IMPRESSION:    HEAD CT:   1.  No acute intracranial abnormalities identified.       HEAD CTA:    1.  No high-grade stenosis or intracranial branch occlusion.      NECK CTA:   1.  No measurable stenosis or evidence of dissection.      CTA Chest Abdomen Pelvis w Contrast   Final Result   Abnormal   IMPRESSION:   1.  Acute type A dissection of the ascending thoracic aorta as detailed above.    2.  Mild mural thickening of the patent thoracic aortic arch, brachiocephalic arteries and proximal descending thoracic could indicate early intramural hemorrhage.   3.  Mild cardiac enlargement with 4 mm radial thickness hemopericardium.   4.  Fluid distention of the stomach.      [Critical Result: Acute dissection ascending thoracic aorta]      Finding was identified on 1/22/2022 2:26 PM.       Dr. Marks and Dr. Webster were contacted by me on 1/22/2022 2:30 PM and verbalized understanding of the critical result.           EKG:    Performed at: 11:21    I have independently reviewed and interpreted the EKG, along with the final read. EKG also reviewed by Dr. Lechuga.    Ventricular rate 64 bpm  OR interval 190 ms  QRS duration 76 ms  QT/QTc 432/445 ms  P-R-T axes 64 51 100    Impression: Sinus rhythm; T wave inversion aVL    PROCEDURES:   [facility]    -Intubation    Date/Time: 1/22/2022 3:44 PM  Performed by: Jacki Ramsey PA-C  Authorized by: Cody Lechuga MD     Risks, benefits and alternatives discussed.      PRE-PROCEDURE DETAILS     Patient status:  Unresponsive    Pretreatment meds: etomidate 30 mg.    Paralytics:  Rocuronium      PROCEDURE DETAILS     Preoxygenation:  Nasal cannula    CPR in progress: yes      Intubation method:  Oral    Oral intubation technique:  Video-assisted (Glidescope)    Laryngoscope blade:  Mac 4    Tube size (mm):  7.0    Tube type:  Cuffed    Number of attempts:  1    Tube visualized through cords: yes      PLACEMENT ASSESSMENT     ETT to teeth:  24    Tube secured with:  ETT carr    Breath sounds:  Equal    Placement verification:  chest rise, equal breath sounds and ETCO2 detector      PROCEDURE    Patient Tolerance:  Patient tolerated the procedure well with no immediate complications        Jacki Ramsey PA-C  Emergency Medicine  [facility] EMERGENCY ROOM  [address] [address]  [phone]  Dept: [phone]       Jacki Ramsey PA-C  01/22/22 1826

## 2022-01-23 NOTE — ED NOTES
[employer] called and states body can be released to [facility] contacted.  They will update on pickup status.

## 2022-01-24 ENCOUNTER — TELEPHONE (OUTPATIENT)
Dept: FAMILY MEDICINE | Facility: CLINIC | Age: 87
End: 2022-01-24
Payer: COMMERCIAL

## 2022-01-24 NOTE — TELEPHONE ENCOUNTER
Date of Death: 2022 15:02  County of Death: [place]  Reporting Person: Anisa Casillas [facility] [phone]  Brian calls to report that An  in the ED at [facility].     Surviving family has been informed.     See encounter for more details.     Request for  to complete death certificate on [facility] web site.